# Patient Record
Sex: FEMALE | Race: OTHER | HISPANIC OR LATINO | Employment: UNEMPLOYED | URBAN - METROPOLITAN AREA
[De-identification: names, ages, dates, MRNs, and addresses within clinical notes are randomized per-mention and may not be internally consistent; named-entity substitution may affect disease eponyms.]

---

## 2018-01-15 ENCOUNTER — APPOINTMENT (EMERGENCY)
Dept: RADIOLOGY | Facility: HOSPITAL | Age: 28
End: 2018-01-15
Payer: COMMERCIAL

## 2018-01-15 ENCOUNTER — HOSPITAL ENCOUNTER (EMERGENCY)
Facility: HOSPITAL | Age: 28
Discharge: HOME | End: 2018-01-15
Attending: EMERGENCY MEDICINE | Admitting: EMERGENCY MEDICINE
Payer: COMMERCIAL

## 2018-01-15 VITALS
RESPIRATION RATE: 18 BRPM | WEIGHT: 278 LBS | HEART RATE: 89 BPM | SYSTOLIC BLOOD PRESSURE: 136 MMHG | TEMPERATURE: 98.2 F | DIASTOLIC BLOOD PRESSURE: 75 MMHG | BODY MASS INDEX: 42.13 KG/M2 | HEIGHT: 68 IN | OXYGEN SATURATION: 96 %

## 2018-01-15 DIAGNOSIS — R11.10 VOMITING: ICD-10-CM

## 2018-01-15 DIAGNOSIS — R55 SYNCOPE: Primary | ICD-10-CM

## 2018-01-15 DIAGNOSIS — I48.91 A-FIB (HCC): ICD-10-CM

## 2018-01-15 LAB
ANION GAP SERPL CALCULATED.3IONS-SCNC: 7 MMOL/L (ref 4–13)
APTT PPP: 34 SECONDS (ref 23–35)
ATRIAL RATE: 62 BPM
BASOPHILS # BLD AUTO: 0.05 THOUSANDS/ΜL (ref 0–0.1)
BASOPHILS NFR BLD AUTO: 0 % (ref 0–1)
BUN SERPL-MCNC: 13 MG/DL (ref 5–25)
CALCIUM SERPL-MCNC: 9.1 MG/DL (ref 8.3–10.1)
CHLORIDE SERPL-SCNC: 106 MMOL/L (ref 100–108)
CO2 SERPL-SCNC: 27 MMOL/L (ref 21–32)
CREAT SERPL-MCNC: 0.77 MG/DL (ref 0.6–1.3)
EOSINOPHIL # BLD AUTO: 0.31 THOUSAND/ΜL (ref 0–0.61)
EOSINOPHIL NFR BLD AUTO: 2 % (ref 0–6)
ERYTHROCYTE [DISTWIDTH] IN BLOOD BY AUTOMATED COUNT: 13.7 % (ref 11.6–15.1)
GFR SERPL CREATININE-BSD FRML MDRD: 106 ML/MIN/1.73SQ M
GLUCOSE SERPL-MCNC: 113 MG/DL (ref 65–140)
HCT VFR BLD AUTO: 43 % (ref 34.8–46.1)
HGB BLD-MCNC: 13.3 G/DL (ref 11.5–15.4)
INR PPP: 0.98 (ref 0.86–1.16)
LYMPHOCYTES # BLD AUTO: 1.44 THOUSANDS/ΜL (ref 0.6–4.47)
LYMPHOCYTES NFR BLD AUTO: 10 % (ref 14–44)
MCH RBC QN AUTO: 24.8 PG (ref 26.8–34.3)
MCHC RBC AUTO-ENTMCNC: 30.9 G/DL (ref 31.4–37.4)
MCV RBC AUTO: 80 FL (ref 82–98)
MONOCYTES # BLD AUTO: 0.55 THOUSAND/ΜL (ref 0.17–1.22)
MONOCYTES NFR BLD AUTO: 4 % (ref 4–12)
NEUTROPHILS # BLD AUTO: 11.45 THOUSANDS/ΜL (ref 1.85–7.62)
NEUTS SEG NFR BLD AUTO: 84 % (ref 43–75)
NT-PROBNP SERPL-MCNC: 100 PG/ML
P AXIS: 57 DEGREES
PLATELET # BLD AUTO: 320 THOUSANDS/UL (ref 149–390)
PMV BLD AUTO: 10.4 FL (ref 8.9–12.7)
POTASSIUM SERPL-SCNC: 3.8 MMOL/L (ref 3.5–5.3)
PR INTERVAL: 140 MS
PROTHROMBIN TIME: 13.3 SECONDS (ref 12.1–14.4)
QRS AXIS: 5 DEGREES
QRSD INTERVAL: 118 MS
QT INTERVAL: 416 MS
QTC INTERVAL: 413 MS
RBC # BLD AUTO: 5.37 MILLION/UL (ref 3.81–5.12)
SODIUM SERPL-SCNC: 140 MMOL/L (ref 136–145)
T WAVE AXIS: 34 DEGREES
TROPONIN I SERPL-MCNC: <0.02 NG/ML
VENTRICULAR RATE: 59 BPM
WBC # BLD AUTO: 13.8 THOUSAND/UL (ref 4.31–10.16)

## 2018-01-15 PROCEDURE — 36415 COLL VENOUS BLD VENIPUNCTURE: CPT | Performed by: EMERGENCY MEDICINE

## 2018-01-15 PROCEDURE — 71045 X-RAY EXAM CHEST 1 VIEW: CPT

## 2018-01-15 PROCEDURE — 80048 BASIC METABOLIC PNL TOTAL CA: CPT | Performed by: EMERGENCY MEDICINE

## 2018-01-15 PROCEDURE — 85610 PROTHROMBIN TIME: CPT | Performed by: EMERGENCY MEDICINE

## 2018-01-15 PROCEDURE — 84484 ASSAY OF TROPONIN QUANT: CPT | Performed by: EMERGENCY MEDICINE

## 2018-01-15 PROCEDURE — 99284 EMERGENCY DEPT VISIT MOD MDM: CPT

## 2018-01-15 PROCEDURE — 85025 COMPLETE CBC W/AUTO DIFF WBC: CPT | Performed by: EMERGENCY MEDICINE

## 2018-01-15 PROCEDURE — 83880 ASSAY OF NATRIURETIC PEPTIDE: CPT | Performed by: EMERGENCY MEDICINE

## 2018-01-15 PROCEDURE — 85730 THROMBOPLASTIN TIME PARTIAL: CPT | Performed by: EMERGENCY MEDICINE

## 2018-01-15 PROCEDURE — 96360 HYDRATION IV INFUSION INIT: CPT

## 2018-01-15 PROCEDURE — 93005 ELECTROCARDIOGRAM TRACING: CPT

## 2018-01-15 RX ADMIN — SODIUM CHLORIDE 1000 ML: 0.9 INJECTION, SOLUTION INTRAVENOUS at 16:07

## 2018-01-15 NOTE — ED PROVIDER NOTES
History  Chief Complaint   Patient presents with    Syncope     Pt reports "not feeling well" the last week, states "chest pain the last 3 days" vomiting, "bright red blood" with stool prior to arrival  Pt states on the way here syncopal episode  Pt reports "not feeling right since " Pt reports hx of Afib, reports taking metoprolol and eliquis       History provided by:  Patient  Chest Pain   Pain location:  Substernal area  Pain quality: aching    Pain radiates to:  Does not radiate  Pain severity:  Moderate  Onset quality:  Gradual  Timing:  Constant  Progression:  Worsening  Chronicity:  New  Context: breathing and movement    Relieved by:  Nothing  Worsened by:  Nothing tried  Ineffective treatments:  None tried  Associated symptoms: abdominal pain, nausea and vomiting    Associated symptoms: no cough, no dizziness, no dysphagia, no fever, no headache, no numbness, no shortness of breath and no weakness    Associated symptoms comment:  Bloody stools        None       Past Medical History:   Diagnosis Date    A-fib [facility])        History reviewed  No pertinent surgical history  History reviewed  No pertinent family history  I have reviewed and agree with the history as documented  Social History   Substance Use Topics    Smoking status: Current Every Day Smoker    Smokeless tobacco: Not on file    Alcohol use No        Review of Systems   Constitutional: Negative for chills and fever  HENT: Negative for rhinorrhea, sore throat and trouble swallowing  Eyes: Negative for pain  Respiratory: Negative for cough, shortness of breath, wheezing and stridor  Cardiovascular: Positive for chest pain  Negative for leg swelling  Gastrointestinal: Positive for abdominal pain, nausea and vomiting  Negative for diarrhea  Endocrine: Negative for polyuria  Genitourinary: Negative for dysuria, flank pain and urgency  Musculoskeletal: Negative for joint swelling, myalgias and neck stiffness     Skin: Negative for rash  Allergic/Immunologic: Negative for immunocompromised state  Neurological: Negative for dizziness, syncope, weakness, numbness and headaches  Psychiatric/Behavioral: Negative for confusion and suicidal ideas  All other systems reviewed and are negative  Physical Exam  ED Triage Vitals [01/15/18 1332]   Temperature Pulse Respirations Blood Pressure SpO2   98 4 °F (36 9 °C) 71 20 131/87 98 %      Temp Source Heart Rate Source Patient Position - Orthostatic VS BP Location FiO2 (%)   Oral Monitor Sitting Left arm --      Pain Score       5           Orthostatic Vital Signs  Vitals:    01/15/18 1332 01/15/18 1530   BP: 131/87 136/75   Pulse: 71 89   Patient Position - Orthostatic VS: Sitting        Physical Exam   Constitutional: She is oriented to person, place, and time  She appears well-developed and well-nourished  HENT:   Head: Normocephalic and atraumatic  Eyes: EOM are normal  Pupils are equal, round, and reactive to light  Neck: Normal range of motion  Neck supple  Cardiovascular: Normal rate and regular rhythm  Exam reveals no friction rub  No murmur heard  Pulmonary/Chest: Breath sounds normal  No respiratory distress  She has no wheezes  She has no rales  Abdominal: Soft  Bowel sounds are normal  She exhibits no distension  There is no tenderness  Obese      Musculoskeletal: Normal range of motion  She exhibits no edema or tenderness  Neurological: She is alert and oriented to person, place, and time  Skin: Skin is warm  No rash noted  Psychiatric: She has a normal mood and affect  Nursing note and vitals reviewed        ED Medications  Medications   sodium chloride 0 9 % bolus 1,000 mL (1,000 mL Intravenous New Bag 1/15/18 1607)       Diagnostic Studies  Results Reviewed     Procedure Component Value Units Date/Time    Basic metabolic panel [[id number]] Collected:  01/15/18 1531    Lab Status:  Final result Specimen:  Blood from Arm, Right Updated: 01/15/18 1604     Sodium 140 mmol/L      Potassium 3 8 mmol/L      Chloride 106 mmol/L      CO2 27 mmol/L      Anion Gap 7 mmol/L      BUN 13 mg/dL      Creatinine 0 77 mg/dL      Glucose 113 mg/dL      Calcium 9 1 mg/dL      eGFR 106 ml/min/1 73sq m     Narrative:         National Kidney Disease Education Program recommendations are as follows:  GFR calculation is accurate only with a steady state creatinine  Chronic Kidney disease less than 60 ml/min/1 73 sq  meters  Kidney failure less than 15 ml/min/1 73 sq  meters  B-type natriuretic peptide [[id number]]  (Normal) Collected:  01/15/18 1531    Lab Status:  Final result Specimen:  Blood from Arm, Right Updated:  01/15/18 1604     NT-proBNP 100 pg/mL     Troponin I [[id number]]  (Normal) Collected:  01/15/18 1531    Lab Status:  Final result Specimen:  Blood from Arm, Right Updated:  01/15/18 1601     Troponin I <0 02 ng/mL     Narrative:         [employer] Chemistry analyzer 99% cutoff is > 0 04 ng/mL in network labs    o cTnI 99% cutoff is useful only when applied to patients in the clinical setting of myocardial ischemia  o cTnI 99% cutoff should be interpreted in the context of clinical history, ECG findings and possibly cardiac imaging to establish correct diagnosis  o cTnI 99% cutoff may be suggestive but clearly not indicative of a coronary event without the clinical setting of myocardial ischemia  Nuria Cooper [[id number]]  (Normal) Collected:  01/15/18 1531    Lab Status:  Final result Specimen:  Blood from Arm, Right Updated:  01/15/18 1554     Protime 13 3 seconds      INR 0 98    APTT [[id number]]  (Normal) Collected:  01/15/18 1531    Lab Status:  Final result Specimen:  Blood from Arm, Right Updated:  01/15/18 1554     PTT 34 seconds     Narrative:          Therapeutic Heparin Range = 60-90 seconds    CBC and differential [[id number]]  (Abnormal) Collected:  01/15/18 1531    Lab Status:  Final result Specimen:  Blood from Arm, Right Updated:  01/15/18 1541 WBC 13 80 (H) Thousand/uL      RBC 5 37 (H) Million/uL      Hemoglobin 13 3 g/dL      Hematocrit 43 0 %      MCV 80 (L) fL      MCH 24 8 (L) pg      MCHC 30 9 (L) g/dL      RDW 13 7 %      MPV 10 4 fL      Platelets 958 Thousands/uL      Neutrophils Relative 84 (H) %      Lymphocytes Relative 10 (L) %      Monocytes Relative 4 %      Eosinophils Relative 2 %      Basophils Relative 0 %      Neutrophils Absolute 11 45 (H) Thousands/µL      Lymphocytes Absolute 1 44 Thousands/µL      Monocytes Absolute 0 55 Thousand/µL      Eosinophils Absolute 0 31 Thousand/µL      Basophils Absolute 0 05 Thousands/µL     POCT urinalysis dipstick [[id number]]     Lab Status:  No result Specimen:  Urine     POCT pregnancy, urine [[id number]]     Lab Status:  No result                  XR chest 1 view portable   Final Result by Lum Landau, MD (01/15 3924)      No active pulmonary disease  Workstation performed: [id number]                    Procedures  ECG 12 Lead Documentation  Date/Time: 1/15/2018 1:38 PM  Performed by: Heather Franklin  Authorized by: Heather Franklin     Comments:      Sinus bradycardia with sinus arrhythmia nonspecific intraventricular conduction delay  No ST segment elevations or depressions no ischemic changes  Phone Contacts  ED Phone Contact    ED Course  ED Course                                MDM  Number of Diagnoses or Management Options  A-fib [facility]): new and requires workup  Syncope: new and requires workup  Vomiting: new and requires workup  Diagnosis management comments: This 54-year-old female with history of PCOS  The patient has a history of AFib  On Eliquis metoprolol at this point time  She has intermittent chest discomfort that she is so she ate as well as multiple up multiple episodes of vomiting and some bloody stool as well earlier today  Here in the emergency department blood work has since been unremarkable EKG is unremarkable as well   Here in the emergency department spoke with the patient about stopping smoking  Has appointment with cardiologist in 2 days  I feel like the patient's vomiting as well as intermittent diarrhea and abdominal discomfort is likely secondary to a viral illness  Recommend outpatient management followup given strict instructions when to re      Pt re-examined and evaluated after testing and treatment  Spoke with the patient and feeling improved and sxs have resolved  Will discharge home with close f/u with pcp and instructed to return to the ED if sxs worsen or continue  Pt agrees with the plan for discharge and feels comfortable to go home with proper f/u  Advised to return for worsening or additional problems  Diagnostic tests were reviewed and questions answered  Diagnosis, care plan and treatment options were discussed  The patient understand instructions and will follow up as directed '  turn back to the emergency department  Amount and/or Complexity of Data Reviewed  Clinical lab tests: reviewed and ordered  Tests in the radiology section of CPT®: ordered and reviewed  Review and summarize past medical records: yes      CritCare Time    Disposition  Final diagnoses:   Syncope   Vomiting   A-fib ([address] )     Time reflects when diagnosis was documented in both MDM as applicable and the Disposition within this note     Time User Action Codes Description Comment    1/15/2018  4:58 PM Margarita Rios Add [R55] Syncope     1/15/2018  4:58 PM Farhan LAI Add [R11 10] Vomiting     1/15/2018  4:59 PM Margarita Rios Add [I48 91] A-fib [facility])       ED Disposition     ED Disposition Condition Comment    Discharge  Robertabhinav Bennett discharge to home/self care  Condition at discharge: Stable        Follow-up Information    None       Patient's Medications    No medications on file     No discharge procedures on file      ED Provider  Electronically Signed by           Pieter Grijalva DO  01/15/18 2878

## 2018-01-15 NOTE — DISCHARGE INSTRUCTIONS
A-fib (Atrial Fibrillation)   WHAT YOU NEED TO KNOW:   A-fib may come and go, or it may be a long-term condition  A-fib can cause blood clots, stroke, or heart failure  These conditions may become life-threatening  It is important to treat and manage a-fib to help prevent a blood clot, stroke, or heart failure  DISCHARGE INSTRUCTIONS:   Call 911 for any of the following:   · You have any of the following signs of a heart attack:      ¨ Squeezing, pressure, or pain in your chest that lasts longer than 5 minutes or returns    ¨ Discomfort or pain in your back, neck, jaw, stomach, or arm     ¨ Trouble breathing    ¨ Nausea or vomiting    ¨ Lightheadedness or a sudden cold sweat, especially with chest pain or trouble breathing    · You have any of the following signs of a stroke:      ¨ Numbness or drooping on one side of your face     ¨ Weakness in an arm or leg    ¨ Confusion or difficulty speaking    ¨ Dizziness, a severe headache, or vision loss  Return to the emergency department if:  You have any of the following signs of a blood clot:  · You feel lightheaded, are short of breath, and have chest pain  · You cough up blood  · You have swelling, redness, pain, or warmth in your arm or leg  Contact your cardiologist or healthcare provider if:   · Your target heart rate is not in the range it should be  · You have new or worsening swelling in your legs, feet, ankles, or abdomen  · You are short of breath, even at rest      · You have questions or concerns about your condition or care  Medicines: You may need any of the following:  · Heart medicines  help control your heart rate and rhythm  You may need more than one medicine to treat your symptoms  · Blood thinners    help prevent blood clots  Examples of blood thinners include heparin and warfarin  Clots can cause strokes, heart attacks, and death   The following are general safety guidelines to follow while you are taking a blood thinner:    ¨ Watch for bleeding and bruising while you take blood thinners  Watch for bleeding from your gums or nose  Watch for blood in your urine and bowel movements  Use a soft washcloth on your skin, and a soft toothbrush to brush your teeth  This can keep your skin and gums from bleeding  If you shave, use an electric shaver  Do not play contact sports  ¨ Tell your dentist and other healthcare providers that you take anticoagulants  Wear a bracelet or necklace that says you take this medicine  ¨ Do not start or stop any medicines unless your healthcare provider tells you to  Many medicines cannot be used with blood thinners  ¨ Tell your healthcare provider right away if you forget to take the medicine, or if you take too much  ¨ Warfarin  is a blood thinner that you may need to take  The following are things you should be aware of if you take warfarin  § Foods and medicines can affect the amount of warfarin in your blood  Do not make major changes to your diet while you take warfarin  Warfarin works best when you eat about the same amount of vitamin K every day  Vitamin K is found in green leafy vegetables and certain other foods  Ask for more information about what to eat when you are taking warfarin  § You will need to see your healthcare provider for follow-up visits when you are on warfarin  You will need regular blood tests  These tests are used to decide how much medicine you need  · Antiplatelets , such as aspirin, help prevent blood clots  Take your antiplatelet medicine exactly as directed  These medicines make it more likely for you to bleed or bruise  If you are told to take aspirin, do not take acetaminophen or ibuprofen instead  · Take your medicine as directed  Contact your healthcare provider if you think your medicine is not helping or if you have side effects  Tell him or her if you are allergic to any medicine   Keep a list of the medicines, vitamins, and herbs you take  Include the amounts, and when and why you take them  Bring the list or the pill bottles to follow-up visits  Carry your medicine list with you in case of an emergency  Follow up with your cardiologist as directed: You will need regular blood tests and monitoring  Write down your questions so you remember to ask them during your visits  Manage A-fib:   · Know your target heart rate  Learn how to take your pulse and monitor your heart rate  · Manage other health conditions  This includes high blood pressure, sleep apnea, thyroid disease, diabetes, and other heart conditions  Take medicine as directed and follow your treatment plan  · Limit or do not drink alcohol  Alcohol can make a-fib hard to manage  Ask your healthcare provider if it is safe for you to drink alcohol  A drink of alcohol is 12 ounces of beer, 5 ounces of wine, or 1½ ounces of liquor  · Do not smoke  Nicotine and other chemicals in cigarettes and cigars can cause heart and lung damage  Ask your healthcare provider for information if you currently smoke and need help to quit  E-cigarettes or smokeless tobacco still contain nicotine  Talk to your healthcare provider before you use these products  · Eat heart-healthy foods  Heart healthy foods will help keep your cholesterol low  These include fruits, vegetables, whole-grain breads, low-fat dairy products, beans, lean meats, and fish  Replace butter and margarine with heart-healthy oils such as olive oil and canola oil  · Maintain a healthy weight  Ask your healthcare provider how much you should weigh  Ask him to help you create a weight loss plan if you are overweight  · Exercise for 30 minutes  most days of the week  Ask your healthcare provider about the best exercise plan for you  © 2017 [address] Information is for End User's use only and may not be sold, redistributed or otherwise used for commercial purposes   All illustrations and images included in CareNotes® are the copyrighted property of [employer]  or [employer]  The above information is an  only  It is not intended as medical advice for individual conditions or treatments  Talk to your doctor, nurse or pharmacist before following any medical regimen to see if it is safe and effective for you  A-fib (Atrial Fibrillation)   WHAT YOU NEED TO KNOW:   A-fib may come and go, or it may be a long-term condition  A-fib can cause blood clots, stroke, or heart failure  These conditions may become life-threatening  It is important to treat and manage a-fib to help prevent a blood clot, stroke, or heart failure  DISCHARGE INSTRUCTIONS:   Call 911 for any of the following:   · You have any of the following signs of a heart attack:      ¨ Squeezing, pressure, or pain in your chest that lasts longer than 5 minutes or returns    ¨ Discomfort or pain in your back, neck, jaw, stomach, or arm     ¨ Trouble breathing    ¨ Nausea or vomiting    ¨ Lightheadedness or a sudden cold sweat, especially with chest pain or trouble breathing    · You have any of the following signs of a stroke:      ¨ Numbness or drooping on one side of your face     ¨ Weakness in an arm or leg    ¨ Confusion or difficulty speaking    ¨ Dizziness, a severe headache, or vision loss  Seek care immediately if:  You have any of the following signs of a blood clot:  · You feel lightheaded, are short of breath, and have chest pain  · You cough up blood  · You have swelling, redness, pain, or warmth in your arm or leg  Contact your cardiologist or healthcare provider if:   · Your heart rate is higher than your healthcare provider said it should be  · You have new or worsening swelling in your legs, feet, ankles, or abdomen  · You are short of breath, even at rest      · You have questions or concerns about your condition or care  Medicines:   You may need any of the following:  · Heart medicines  help control your heart rate and rhythm  You may need more than one medicine to treat your symptoms  · Blood thinners    help prevent blood clots  Examples of blood thinners include heparin and warfarin  Clots can cause strokes, heart attacks, and death  The following are general safety guidelines to follow while you are taking a blood thinner:    ¨ Watch for bleeding and bruising while you take blood thinners  Watch for bleeding from your gums or nose  Watch for blood in your urine and bowel movements  Use a soft washcloth on your skin, and a soft toothbrush to brush your teeth  This can keep your skin and gums from bleeding  If you shave, use an electric shaver  Do not play contact sports  ¨ Tell your dentist and other healthcare providers that you take anticoagulants  Wear a bracelet or necklace that says you take this medicine  ¨ Do not start or stop any medicines unless your healthcare provider tells you to  Many medicines cannot be used with blood thinners  ¨ Tell your healthcare provider right away if you forget to take the medicine, or if you take too much  ¨ Warfarin  is a blood thinner that you may need to take  The following are things you should be aware of if you take warfarin  § Foods and medicines can affect the amount of warfarin in your blood  Do not make major changes to your diet while you take warfarin  Warfarin works best when you eat about the same amount of vitamin K every day  Vitamin K is found in green leafy vegetables and certain other foods  Ask for more information about what to eat when you are taking warfarin  § You will need to see your healthcare provider for follow-up visits when you are on warfarin  You will need regular blood tests  These tests are used to decide how much medicine you need  · Antiplatelets , such as aspirin, help prevent blood clots  Take your antiplatelet medicine exactly as directed   These medicines make it more likely for you to bleed or bruise  If you are told to take aspirin, do not take acetaminophen or ibuprofen instead  · Take your medicine as directed  Contact your healthcare provider if you think your medicine is not helping or if you have side effects  Tell him or her if you are allergic to any medicine  Keep a list of the medicines, vitamins, and herbs you take  Include the amounts, and when and why you take them  Bring the list or the pill bottles to follow-up visits  Carry your medicine list with you in case of an emergency  Follow up with your cardiologist as directed: You will need regular blood tests and monitoring  Write down your questions so you remember to ask them during your visits  Manage A-fib:   · Know your target heart rate  Learn how to take your pulse and monitor your heart rate  · Manage other health conditions  This includes high blood pressure, sleep apnea, thyroid disease, diabetes, and other heart conditions  Take medicine as directed and follow your treatment plan  · Limit or do not drink alcohol  Alcohol can make a-fib hard to manage  Ask your healthcare provider if it is safe for you to drink alcohol  A drink of alcohol is 12 ounces of beer, 5 ounces of wine, or 1½ ounces of liquor  · Do not smoke  Nicotine and other chemicals in cigarettes and cigars can cause heart and lung damage  Ask your healthcare provider for information if you currently smoke and need help to quit  E-cigarettes or smokeless tobacco still contain nicotine  Talk to your healthcare provider before you use these products  · Eat heart-healthy foods  Heart healthy foods will help keep your cholesterol low  These include fruits, vegetables, whole-grain breads, low-fat dairy products, beans, lean meats, and fish  Replace butter and margarine with heart-healthy oils such as olive oil and canola oil  · Maintain a healthy weight  Ask your healthcare provider how much you should weigh  Ask him to help you create a weight loss plan if you are overweight  · Exercise for 30 minutes  most days of the week  Ask your healthcare provider about the best exercise plan for you  © 2017 [facility] Information is for End User's use only and may not be sold, redistributed or otherwise used for commercial purposes  All illustrations and images included in CareNotes® are the copyrighted property of [employer]  or [employer]  The above information is an  only  It is not intended as medical advice for individual conditions or treatments  Talk to your doctor, nurse or pharmacist before following any medical regimen to see if it is safe and effective for you

## 2018-01-20 ENCOUNTER — APPOINTMENT (EMERGENCY)
Dept: RADIOLOGY | Facility: HOSPITAL | Age: 28
End: 2018-01-20
Payer: COMMERCIAL

## 2018-01-20 ENCOUNTER — HOSPITAL ENCOUNTER (EMERGENCY)
Facility: HOSPITAL | Age: 28
Discharge: HOME | End: 2018-01-20
Attending: EMERGENCY MEDICINE | Admitting: EMERGENCY MEDICINE
Payer: COMMERCIAL

## 2018-01-20 VITALS
HEART RATE: 71 BPM | DIASTOLIC BLOOD PRESSURE: 84 MMHG | BODY MASS INDEX: 51.09 KG/M2 | TEMPERATURE: 97.8 F | OXYGEN SATURATION: 99 % | WEIGHT: 293 LBS | RESPIRATION RATE: 20 BRPM | SYSTOLIC BLOOD PRESSURE: 149 MMHG

## 2018-01-20 DIAGNOSIS — R07.89 CHEST WALL PAIN: Primary | ICD-10-CM

## 2018-01-20 LAB
ANION GAP SERPL CALCULATED.3IONS-SCNC: 8 MMOL/L (ref 4–13)
BASOPHILS # BLD AUTO: 0.08 THOUSANDS/ΜL (ref 0–0.1)
BASOPHILS NFR BLD AUTO: 1 % (ref 0–1)
BUN SERPL-MCNC: 15 MG/DL (ref 5–25)
CALCIUM SERPL-MCNC: 9 MG/DL (ref 8.3–10.1)
CHLORIDE SERPL-SCNC: 106 MMOL/L (ref 100–108)
CO2 SERPL-SCNC: 26 MMOL/L (ref 21–32)
CREAT SERPL-MCNC: 0.79 MG/DL (ref 0.6–1.3)
EOSINOPHIL # BLD AUTO: 0.71 THOUSAND/ΜL (ref 0–0.61)
EOSINOPHIL NFR BLD AUTO: 7 % (ref 0–6)
ERYTHROCYTE [DISTWIDTH] IN BLOOD BY AUTOMATED COUNT: 13.5 % (ref 11.6–15.1)
GFR SERPL CREATININE-BSD FRML MDRD: 103 ML/MIN/1.73SQ M
GLUCOSE SERPL-MCNC: 120 MG/DL (ref 65–140)
HCG SERPL QL: NEGATIVE
HCT VFR BLD AUTO: 37.6 % (ref 34.8–46.1)
HGB BLD-MCNC: 12 G/DL (ref 11.5–15.4)
LYMPHOCYTES # BLD AUTO: 2.51 THOUSANDS/ΜL (ref 0.6–4.47)
LYMPHOCYTES NFR BLD AUTO: 25 % (ref 14–44)
MCH RBC QN AUTO: 25.3 PG (ref 26.8–34.3)
MCHC RBC AUTO-ENTMCNC: 31.9 G/DL (ref 31.4–37.4)
MCV RBC AUTO: 79 FL (ref 82–98)
MONOCYTES # BLD AUTO: 0.68 THOUSAND/ΜL (ref 0.17–1.22)
MONOCYTES NFR BLD AUTO: 7 % (ref 4–12)
NEUTROPHILS # BLD AUTO: 5.9 THOUSANDS/ΜL (ref 1.85–7.62)
NEUTS SEG NFR BLD AUTO: 60 % (ref 43–75)
PLATELET # BLD AUTO: 319 THOUSANDS/UL (ref 149–390)
PMV BLD AUTO: 10.3 FL (ref 8.9–12.7)
POTASSIUM SERPL-SCNC: 4.2 MMOL/L (ref 3.5–5.3)
RBC # BLD AUTO: 4.74 MILLION/UL (ref 3.81–5.12)
SODIUM SERPL-SCNC: 140 MMOL/L (ref 136–145)
TROPONIN I SERPL-MCNC: <0.02 NG/ML
WBC # BLD AUTO: 9.88 THOUSAND/UL (ref 4.31–10.16)

## 2018-01-20 PROCEDURE — 84484 ASSAY OF TROPONIN QUANT: CPT | Performed by: EMERGENCY MEDICINE

## 2018-01-20 PROCEDURE — 85025 COMPLETE CBC W/AUTO DIFF WBC: CPT | Performed by: EMERGENCY MEDICINE

## 2018-01-20 PROCEDURE — 93005 ELECTROCARDIOGRAM TRACING: CPT | Performed by: EMERGENCY MEDICINE

## 2018-01-20 PROCEDURE — 71046 X-RAY EXAM CHEST 2 VIEWS: CPT

## 2018-01-20 PROCEDURE — 93005 ELECTROCARDIOGRAM TRACING: CPT

## 2018-01-20 PROCEDURE — 80048 BASIC METABOLIC PNL TOTAL CA: CPT | Performed by: EMERGENCY MEDICINE

## 2018-01-20 PROCEDURE — 99285 EMERGENCY DEPT VISIT HI MDM: CPT

## 2018-01-20 PROCEDURE — 36415 COLL VENOUS BLD VENIPUNCTURE: CPT | Performed by: EMERGENCY MEDICINE

## 2018-01-20 PROCEDURE — 84703 CHORIONIC GONADOTROPIN ASSAY: CPT | Performed by: EMERGENCY MEDICINE

## 2018-01-21 LAB
ATRIAL RATE: 73 BPM
P AXIS: 30 DEGREES
PR INTERVAL: 136 MS
QRS AXIS: 2 DEGREES
QRSD INTERVAL: 110 MS
QT INTERVAL: 384 MS
QTC INTERVAL: 415 MS
T WAVE AXIS: 22 DEGREES
VENTRICULAR RATE: 70 BPM

## 2018-01-21 NOTE — DISCHARGE INSTRUCTIONS
Rest  Heat to the area  Follow up with your doctor     Chest Wall Pain   WHAT YOU NEED TO KNOW:   Chest wall pain may be caused by problems with the muscles, cartilage, or bones of the chest wall  Chest wall pain may also be caused by pain that spreads to your chest from another part of your body  The pain may be aching, severe, dull, or sharp  It may come and go, or it may be constant  The pain may be worse when you move in certain ways, breathe deeply, or cough  DISCHARGE INSTRUCTIONS:   Call 911 if:   · You have any of the following signs of a heart attack:      ¨ Squeezing, pressure, or pain in your chest that lasts longer than 5 minutes or returns    ¨ Discomfort or pain in your back, neck, jaw, stomach, or arm     ¨ Trouble breathing    ¨ Nausea or vomiting    ¨ Lightheadedness or a sudden cold sweat, especially with chest pain or trouble breathing    Return to the emergency department if:   · You have severe pain  Contact your healthcare provider if:   · You develop a rash  · You have other new symptoms  · Your pain does not improve, even with treatment  · You have questions or concerns about your condition or care  Medicines: You may need any of the following:  · NSAIDs , such as ibuprofen, help decrease swelling, pain, and fever  This medicine is available with or without a doctor's order  NSAIDs can cause stomach bleeding or kidney problems in certain people  If you take blood thinner medicine, always ask your healthcare provider if NSAIDs are safe for you  Always read the medicine label and follow directions  · Acetaminophen  decreases pain  It is available without a doctor's order  Ask how much to take and how often to take it  Follow directions  Acetaminophen can cause liver damage if not taken correctly  · A cream  may be applied to your chest to decrease pain  · Take your medicine as directed    Contact your healthcare provider if you think your medicine is not helping or if you have side effects  Tell him of her if you are allergic to any medicine  Keep a list of the medicines, vitamins, and herbs you take  Include the amounts, and when and why you take them  Bring the list or the pill bottles to follow-up visits  Carry your medicine list with you in case of an emergency  Follow up with your healthcare provider as directed:  Write down your questions so you remember to ask them during your visits  Self-care:   · Rest  as needed  Avoid activities that make your chest wall pain worse  · Apply heat  on your chest for 20 to 30 minutes every 2 hours for as many days as directed  Heat helps decrease pain and muscle spasms  · Apply ice  on your chest for 15 to 20 minutes every hour or as directed  Use an ice pack, or put crushed ice in a plastic bag  Cover it with a towel  Ice helps prevent tissue damage and decreases swelling and pain  © 2017 [address] [employer]  Information is for End User's use only and may not be sold, redistributed or otherwise used for commercial purposes  All illustrations and images included in CareNotes® are the copyrighted property of [employer]  or [employer]  The above information is an  only  It is not intended as medical advice for individual conditions or treatments  Talk to your doctor, nurse or pharmacist before following any medical regimen to see if it is safe and effective for you

## 2018-01-21 NOTE — ED PROVIDER NOTES
History  Chief Complaint   Patient presents with    Chest Pain     Pt  complains of chest heaviness, states has been going on for a few weeks, getting worse  Seen recently for same  (+hx  A-Fib)  Pt  states worse when resting, does not feel pain when moving around     HPI patient is a 44-year-old female, history of atrial fibrillation that is intermittent, apparently takes Eliquis to, reports that over the last few days she has had intermittent chest heaviness, describes it as a pressure sensation worse when she palpates her chest   Worse when she touches the area  Worse when she moves  Pain is without radiation  Pain seems to the somewhat come ago although patient was here on Monday with similar pain and reports constant since then  Patient reports she felt that specifically at work tonight so came to the emergency department  She denies any shortness of breath there is no diaphoresis  There is no pain with exertion  She reports the pain is just a constant soreness in her anterior chest again worse when she sits up leans back moves her arms or palpates her chest   Past medical history of atrial fibrillation on anticoagulants, no history of pulmonary emboli  Family history noncontributory  Social history patient is a smoker, denies drug abuse    Prior to Admission Medications   Prescriptions Last Dose Informant Patient Reported? Taking? apixaban (ELIQUIS) 5 mg   Yes Yes   Sig: Take 5 mg by mouth 2 (two) times a day   metoprolol tartrate (LOPRESSOR) 25 mg tablet   Yes Yes   Sig: Take 12 5 mg by mouth every 12 (twelve) hours      Facility-Administered Medications: None       Past Medical History:   Diagnosis Date    [facility]     PCOS (polycystic ovarian syndrome)        History reviewed  No pertinent surgical history  History reviewed  No pertinent family history  I have reviewed and agree with the history as documented      Social History   Substance Use Topics    Smoking status: Current Every Day Smoker     Packs/day: 0 20    Smokeless tobacco: Never Used    Alcohol use No        Review of Systems   Constitutional: Negative for diaphoresis, fatigue and fever  HENT: Negative for congestion, ear pain, nosebleeds and sore throat  Eyes: Negative for photophobia, pain, discharge and visual disturbance  Respiratory: Negative for cough, choking, chest tightness, shortness of breath and wheezing  Cardiovascular: Positive for chest pain  Negative for palpitations  Gastrointestinal: Negative for abdominal distention, abdominal pain, diarrhea and vomiting  Genitourinary: Negative for dysuria, flank pain and frequency  Musculoskeletal: Negative for back pain, gait problem and joint swelling  Skin: Negative for color change and rash  Neurological: Negative for dizziness, syncope and headaches  Psychiatric/Behavioral: Negative for behavioral problems and confusion  The patient is not nervous/anxious  All other systems reviewed and are negative  Physical Exam  ED Triage Vitals [01/20/18 2043]   Temperature Pulse Respirations Blood Pressure SpO2   97 8 °F (36 6 °C) 74 18 131/68 98 %      Temp Source Heart Rate Source Patient Position - Orthostatic VS BP Location FiO2 (%)   Oral Monitor Sitting Left arm --      Pain Score       8           Orthostatic Vital Signs  Vitals:    01/20/18 2043 01/20/18 2158   BP: 131/68 149/84   Pulse: 74 71   Patient Position - Orthostatic VS: Sitting Sitting       Physical Exam   Constitutional: She is oriented to person, place, and time  She appears well-developed and well-nourished  HENT:   Head: Normocephalic  Right Ear: External ear normal    Left Ear: External ear normal    Nose: Nose normal    Mouth/Throat: Oropharynx is clear and moist    Eyes: EOM and lids are normal  Pupils are equal, round, and reactive to light  Neck: Normal range of motion  Neck supple     Cardiovascular: Normal rate, regular rhythm, normal heart sounds and intact distal pulses  Pulmonary/Chest: Effort normal and breath sounds normal  No respiratory distress  She exhibits tenderness  There is anterior chest tenderness there is no redness there is no warmth there is no lesions in her breast, there is no sign of cellulitis there is pain with palpation that reproduces her pain   Abdominal: Soft  She exhibits no distension and no mass  There is no tenderness  There is no rebound and no guarding  No hernia  Musculoskeletal: Normal range of motion  She exhibits no deformity  Neurological: She is alert and oriented to person, place, and time  Skin: Skin is warm and dry  Psychiatric: She has a normal mood and affect  Nursing note and vitals reviewed  Pulse oximetry 99 percent on room air adequate oxygenation, there is no hypoxia  ED Medications  Medications - No data to display    Diagnostic Studies  Results Reviewed     Procedure Component Value Units Date/Time    Basic metabolic panel [[id number]]  (Normal) Collected:  01/20/18 2218    Lab Status:  Final result Specimen:  Blood from Arm, Left Updated:  01/20/18 2300     Sodium 140 mmol/L      Potassium 4 2 mmol/L      Chloride 106 mmol/L      CO2 26 mmol/L      Anion Gap 8 mmol/L      BUN 15 mg/dL      Creatinine 0 79 mg/dL      Glucose 120 mg/dL      Calcium 9 0 mg/dL      eGFR 103 ml/min/1 73sq m     Narrative:         National Kidney Disease Education Program recommendations are as follows:  GFR calculation is accurate only with a steady state creatinine  Chronic Kidney disease less than 60 ml/min/1 73 sq  meters  Kidney failure less than 15 ml/min/1 73 sq  meters      hCG, qualitative pregnancy [[id number]]  (Normal) Collected:  01/20/18 2218    Lab Status:  Final result Specimen:  Blood from Arm, Left Updated:  01/20/18 2300     Preg, Serum Negative    Troponin I [[id number]]  (Normal) Collected:  01/20/18 2218    Lab Status:  Final result Specimen:  Blood from Arm, Left Updated:  01/20/18 2246     Troponin I <0 02 ng/mL Narrative:         [employer] Chemistry analyzer 99% cutoff is > 0 04 ng/mL in network labs    o cTnI 99% cutoff is useful only when applied to patients in the clinical setting of myocardial ischemia  o cTnI 99% cutoff should be interpreted in the context of clinical history, ECG findings and possibly cardiac imaging to establish correct diagnosis  o cTnI 99% cutoff may be suggestive but clearly not indicative of a coronary event without the clinical setting of myocardial ischemia      CBC and differential [[id number]]  (Abnormal) Collected:  01/20/18 2218    Lab Status:  Final result Specimen:  Blood from Arm, Left Updated:  01/20/18 2227     WBC 9 88 Thousand/uL      RBC 4 74 Million/uL      Hemoglobin 12 0 g/dL      Hematocrit 37 6 %      MCV 79 (L) fL      MCH 25 3 (L) pg      MCHC 31 9 g/dL      RDW 13 5 %      MPV 10 3 fL      Platelets 802 Thousands/uL      Neutrophils Relative 60 %      Lymphocytes Relative 25 %      Monocytes Relative 7 %      Eosinophils Relative 7 (H) %      Basophils Relative 1 %      Neutrophils Absolute 5 90 Thousands/µL      Lymphocytes Absolute 2 51 Thousands/µL      Monocytes Absolute 0 68 Thousand/µL      Eosinophils Absolute 0 71 (H) Thousand/µL      Basophils Absolute 0 08 Thousands/µL                  XR chest pa & lateral    (Results Pending)              Procedures  ECG 12 Lead Documentation  Date/Time: 1/20/2018 10:27 PM  Performed by: Alfredito Wagner  Authorized by: Alfredito Wagner     Indications / Diagnosis:  Chest pain  ECG reviewed by me, the ED Provider: yes    Patient location:  ED  Previous ECG:     Previous ECG:  Compared to current    Comparison ECG info:  January 15, 2018    Similarity:  No change  Interpretation:     Interpretation: non-specific    Rate:     ECG rate:  Seventy    ECG rate assessment: normal    Rhythm:     Rhythm: sinus rhythm    Ectopy:     Ectopy: none    QRS:     QRS axis:  Normal  ST segments:     ST segments:  Non-specific  T waves:     T waves: non-specific    Comments: There is some baseline artifact, nonspecific ST-T wave changes, incomplete right bundle branch block, otherwise no real change from January 15, 2018           Phone Contacts  ED Phone Contact    ED Course  ED Course         Chest x-ray: Chest x-ray showed a normal cardiac silhouette, no pneumothorax no infiltrates, No sign of pathology, interpreted by me, I was the primary   HEART Risk Score    Flowsheet Row Most Recent Value   History  0 Filed at: 01/21/2018 0432   ECG  0 Filed at: 01/21/2018 0432   Age  0 Filed at: 01/21/2018 0432   Risk Factors  1 Filed at: 01/21/2018 0432   Troponin  0 Filed at: 01/21/2018 0432   Heart Score Risk Calculator   History  0 Filed at: 01/21/2018 0432   ECG  0 Filed at: 01/21/2018 0432   Age  0 Filed at: 01/21/2018 0432   Risk Factors  1 Filed at: 01/21/2018 0432   Troponin  0 Filed at: 01/21/2018 0432   HEART Score  1 Filed at: 01/21/2018 0432   HEART Score  1 Filed at: 01/21/2018 0432         diagnostic testing showed normal white count 9 8, no sign of inflammation, hemoglobin normal at 12 no sign of anemia  pregnancy test was negative  Cardiac troponin was negative no sign of cardiac ischemia despite several days of discomfort  MDM medical decision making 26-year-old female, history of atrial fibrillation in the past presents with anterior chest pain worse with palpation and with movement most consistent with chest wall pain, normal EKG and cardiac troponin despite several days of symptoms  Discussed outpatient follow-up with the patient discussed indications to return    CritCare Time    Disposition  Final diagnoses:   Chest wall pain     Time reflects when diagnosis was documented in both MDM as applicable and the Disposition within this note     Time User Action Codes Description Comment    1/20/2018 11:11 PM Allison Cortes Add [R07 [id number]] Chest wall pain       ED Disposition     ED Disposition Condition Comment Discharge  Robertia Dariusz discharge to home/self care  Condition at discharge: Good        Follow-up Information     Follow up With Specialties Details [address], DO    Chanel Eva  [address],[facility]  [phone]          Discharge Medication List as of 1/20/2018 11:11 PM      CONTINUE these medications which have NOT CHANGED    Details   apixaban (ELIQUIS) 5 mg Take 5 mg by mouth 2 (two) times a day, Historical Med      metoprolol tartrate (LOPRESSOR) 25 mg tablet Take 12 5 mg by mouth every 12 (twelve) hours, Historical Med           No discharge procedures on file      ED Provider  Electronically Signed by           Denzel Dubin, MD  01/21/18 0452

## 2018-02-18 ENCOUNTER — APPOINTMENT (EMERGENCY)
Dept: RADIOLOGY | Facility: HOSPITAL | Age: 28
End: 2018-02-18
Payer: COMMERCIAL

## 2018-02-18 ENCOUNTER — HOSPITAL ENCOUNTER (EMERGENCY)
Facility: HOSPITAL | Age: 28
Discharge: HOME | End: 2018-02-18
Attending: EMERGENCY MEDICINE
Payer: COMMERCIAL

## 2018-02-18 VITALS
DIASTOLIC BLOOD PRESSURE: 63 MMHG | WEIGHT: 280 LBS | TEMPERATURE: 97.7 F | RESPIRATION RATE: 22 BRPM | HEART RATE: 62 BPM | BODY MASS INDEX: 43.95 KG/M2 | OXYGEN SATURATION: 98 % | HEIGHT: 67 IN | SYSTOLIC BLOOD PRESSURE: 145 MMHG

## 2018-02-18 DIAGNOSIS — R07.89 NON-CARDIAC CHEST PAIN: Primary | ICD-10-CM

## 2018-02-18 DIAGNOSIS — F41.9 ANXIETY: ICD-10-CM

## 2018-02-18 LAB
ALBUMIN SERPL BCP-MCNC: 3.3 G/DL (ref 3.5–5)
ALP SERPL-CCNC: 131 U/L (ref 46–116)
ALT SERPL W P-5'-P-CCNC: 33 U/L (ref 12–78)
ANION GAP SERPL CALCULATED.3IONS-SCNC: 5 MMOL/L (ref 4–13)
AST SERPL W P-5'-P-CCNC: 29 U/L (ref 5–45)
ATRIAL RATE: 58 BPM
BASOPHILS # BLD AUTO: 0.2 THOUSANDS/ΜL (ref 0–0.1)
BASOPHILS NFR BLD AUTO: 2 % (ref 0–1)
BILIRUB SERPL-MCNC: 0.3 MG/DL (ref 0.2–1)
BUN SERPL-MCNC: 13 MG/DL (ref 5–25)
CALCIUM SERPL-MCNC: 8.5 MG/DL (ref 8.3–10.1)
CHLORIDE SERPL-SCNC: 109 MMOL/L (ref 100–108)
CO2 SERPL-SCNC: 30 MMOL/L (ref 21–32)
CREAT SERPL-MCNC: 0.59 MG/DL (ref 0.6–1.3)
DEPRECATED D DIMER PPP: 330 NG/ML (FEU) (ref 190–520)
EOSINOPHIL # BLD AUTO: 0.5 THOUSAND/ΜL (ref 0–0.61)
EOSINOPHIL NFR BLD AUTO: 5 % (ref 0–6)
ERYTHROCYTE [DISTWIDTH] IN BLOOD BY AUTOMATED COUNT: 13.7 % (ref 11.6–15.1)
GFR SERPL CREATININE-BSD FRML MDRD: 126 ML/MIN/1.73SQ M
GLUCOSE SERPL-MCNC: 110 MG/DL (ref 65–140)
HCT VFR BLD AUTO: 36.1 % (ref 37–47)
HGB BLD-MCNC: 11.9 G/DL (ref 12–16)
LYMPHOCYTES # BLD AUTO: 2.8 THOUSANDS/ΜL (ref 0.6–4.47)
LYMPHOCYTES NFR BLD AUTO: 30 % (ref 14–44)
MCH RBC QN AUTO: 25.8 PG (ref 27–31)
MCHC RBC AUTO-ENTMCNC: 32.8 G/DL (ref 31.4–37.4)
MCV RBC AUTO: 79 FL (ref 82–98)
MONOCYTES # BLD AUTO: 0.6 THOUSAND/ΜL (ref 0.17–1.22)
MONOCYTES NFR BLD AUTO: 6 % (ref 4–12)
NEUTROPHILS # BLD AUTO: 5.2 THOUSANDS/ΜL (ref 1.85–7.62)
NEUTS SEG NFR BLD AUTO: 56 % (ref 43–75)
NRBC BLD AUTO-RTO: 0 /100 WBCS
P AXIS: 40 DEGREES
PLATELET # BLD AUTO: 313 THOUSANDS/UL (ref 130–400)
PMV BLD AUTO: 8.6 FL (ref 8.9–12.7)
POTASSIUM SERPL-SCNC: 4.4 MMOL/L (ref 3.5–5.3)
PR INTERVAL: 136 MS
PROT SERPL-MCNC: 7 G/DL (ref 6.4–8.2)
QRS AXIS: 3 DEGREES
QRSD INTERVAL: 120 MS
QT INTERVAL: 434 MS
QTC INTERVAL: 426 MS
RBC # BLD AUTO: 4.6 MILLION/UL (ref 4.2–5.4)
SODIUM SERPL-SCNC: 144 MMOL/L (ref 136–145)
T WAVE AXIS: 38 DEGREES
TROPONIN I SERPL-MCNC: <0.02 NG/ML
VENTRICULAR RATE: 58 BPM
WBC # BLD AUTO: 9.3 THOUSAND/UL (ref 4.8–10.8)

## 2018-02-18 PROCEDURE — 93010 ELECTROCARDIOGRAM REPORT: CPT | Performed by: INTERNAL MEDICINE

## 2018-02-18 PROCEDURE — 93005 ELECTROCARDIOGRAM TRACING: CPT

## 2018-02-18 PROCEDURE — 80053 COMPREHEN METABOLIC PANEL: CPT | Performed by: EMERGENCY MEDICINE

## 2018-02-18 PROCEDURE — 85025 COMPLETE CBC W/AUTO DIFF WBC: CPT | Performed by: EMERGENCY MEDICINE

## 2018-02-18 PROCEDURE — 99285 EMERGENCY DEPT VISIT HI MDM: CPT

## 2018-02-18 PROCEDURE — 84484 ASSAY OF TROPONIN QUANT: CPT | Performed by: EMERGENCY MEDICINE

## 2018-02-18 PROCEDURE — 85379 FIBRIN DEGRADATION QUANT: CPT | Performed by: EMERGENCY MEDICINE

## 2018-02-18 PROCEDURE — 36415 COLL VENOUS BLD VENIPUNCTURE: CPT | Performed by: EMERGENCY MEDICINE

## 2018-02-18 PROCEDURE — 71046 X-RAY EXAM CHEST 2 VIEWS: CPT

## 2018-02-19 NOTE — DISCHARGE INSTRUCTIONS
Anxiety   WHAT YOU SHOULD KNOW:   Anxiety is a condition that causes you to feel excessive worry, uneasiness, or fear  Family or work stress, smoking, caffeine, and alcohol can increase your risk for anxiety  Certain medicines or health conditions can also increase your risk  Anxiety may begin gradually, and can become a long-term condition if it is not managed or treated  AFTER YOU LEAVE:   Medicines:   · Medicines  can help you feel more calm and relaxed, and decrease your symptoms  · Take your medicine as directed  Contact your healthcare provider if you think your medicine is not helping or if you have side effects  Tell him if you are allergic to any medicine  Keep a list of the medicines, vitamins, and herbs you take  Include the amounts, and when and why you take them  Bring the list or the pill bottles to follow-up visits  Carry your medicine list with you in case of an emergency  Follow up with your healthcare provider within 2 weeks or as directed:  Write down your questions so you remember to ask them during your visits  Manage anxiety:   · Go to counseling as directed  Cognitive behavioral therapy can help you understand and change how you react to events that trigger your symptoms  · Find ways to manage your symptoms  Activities such as exercise, meditation, or listening to music can help you relax  · Practice deep breathing  Breathing can change how your body reacts to stress  Focus on taking slow, deep breaths several times a day, or during an anxiety attack  Breathe in through your nose, and out through your mouth  · Avoid caffeine  Caffeine can make your symptoms worse  Avoid foods or drinks that are meant to increase your energy level  · Limit or avoid alcohol  Ask your healthcare provider if alcohol is safe for you  You may not be able to drink alcohol if you take certain anxiety or depression medicines  Limit alcohol to 1 drink per day if you are a woman   Limit alcohol to 2 drinks per day if you are a man  A drink of alcohol is 12 ounces of beer, 5 ounces of wine, or 1½ ounces of liquor  Contact your healthcare provider if:   · Your symptoms get worse or do not get better with treatment  · You think your medicine may be causing side effects  · Your anxiety keeps you from doing your regular daily activities  · You have new symptoms since your last visit  · You have questions or concerns about your condition or care  Seek care immediately or call 911 if:   · You have chest pain, tightness, or heaviness that may spread to your shoulders, arms, jaw, neck, or back  · You feel like hurting yourself or someone else  · You feel dizzy, lightheaded, or faint  © 2014 [address] is for End User's use only and may not be sold, redistributed or otherwise used for commercial purposes  All illustrations and images included in CareNotes® are the copyrighted property of [employer]  or [employer]  The above information is an  only  It is not intended as medical advice for individual conditions or treatments  Talk to your doctor, nurse or pharmacist before following any medical regimen to see if it is safe and effective for you  Noncardiac Chest Pain   AMBULATORY CARE:   Noncardiac chest pain  is pain or discomfort in your chest that is not caused by a heart problem  It may be caused by any of the following:  · Acid reflux    · Nerve or muscle problems within the esophagus that slow the movement of food    · Increased sensitivity to pain within your esophagus     · Panic attacks, anxiety, or depression     · Chest wall, muscle, or rib pain  Seek care immediately if:   · You have severe chest pain  Contact your healthcare provider if:   · Your chest pain does not get better, even with treatment  · You have questions or concerns about your condition or care    Treatment  Treatment depends on the cause of your chest pain  You may need any of the following:  · Medicines  may be given to treat the cause of your chest pain  You may be given medicines to decrease pain, relieve anxiety, decrease acid reflux, or relax muscles in your esophagus  · Cognitive therapy  may be helpful if you have panic attacks, anxiety, or depression  It can help you change how you react to situations that tend to trigger your chest pain  Follow up with your healthcare provider as directed:  Write down your questions so you remember to ask them during your visits  © 2017 [address] [facility] Information is for End User's use only and may not be sold, redistributed or otherwise used for commercial purposes  All illustrations and images included in CareNotes® are the copyrighted property of [employer]  or [employer]  The above information is an  only  It is not intended as medical advice for individual conditions or treatments  Talk to your doctor, nurse or pharmacist before following any medical regimen to see if it is safe and effective for you

## 2018-02-19 NOTE — ED PROVIDER NOTES
History  Chief Complaint   Patient presents with    Chest Pain     Patient statesm she started with chest pain today,has continued,has a history of afib     22-year-old morbidly obese white female presents with complaints of intermittent chest pain x1 day  Patient describes it as chest pressure and heaviness that makes it difficult to breathe  No nausea, no vomiting, no fever, no cough, no trauma  Patient has had similar symptoms in the past   Patient has had multiple workups without any definitive explanation  Patient sees a cardiologist   History of AFib  Patient compliant with medication  No leg swelling, no calf tenderness  Intermittent knee pain  Patient states she does have a history of anxiety  Patient refusing any medication at this time  History provided by:  Patient  Chest Pain   Pain location:  Substernal area  Pain quality: pressure    Pain quality: not radiating    Pain radiates to:  Does not radiate  Pain radiates to the back: no    Pain severity:  Moderate  Onset quality:  Gradual  Duration:  1 day  Timing:  Intermittent  Progression:  Waxing and waning  Chronicity:  Recurrent  Context: at rest    Context: not breathing and no movement    Relieved by:  None tried  Worsened by:  Nothing tried  Ineffective treatments:  Leaning forward and rest  Associated symptoms: anxiety    Associated symptoms: no abdominal pain, no claudication, no cough, no diaphoresis, no dizziness, no fever, no nausea, no palpitations, no shortness of breath, not vomiting and no weakness    Risk factors: obesity and smoking        Prior to Admission Medications   Prescriptions Last Dose Informant Patient Reported? Taking?    apixaban (ELIQUIS) 5 mg   Yes Yes   Sig: Take 5 mg by mouth 2 (two) times a day   metoprolol tartrate (LOPRESSOR) 25 mg tablet   Yes Yes   Sig: Take 12 5 mg by mouth every 12 (twelve) hours      Facility-Administered Medications: None       Past Medical History:   Diagnosis Date    A-fib ([facility] )  PCOS (polycystic ovarian syndrome)        Past Surgical History:   Procedure Laterality Date    OOPHORECTOMY         History reviewed  No pertinent family history  I have reviewed and agree with the history as documented  Social History   Substance Use Topics    Smoking status: Current Every Day Smoker     Packs/day: 0 20    Smokeless tobacco: Never Used    Alcohol use No        Review of Systems   Constitutional: Negative for diaphoresis and fever  HENT: Negative  Respiratory: Negative for cough, shortness of breath, wheezing and stridor  Cardiovascular: Positive for chest pain  Negative for palpitations, claudication and leg swelling  Gastrointestinal: Negative for abdominal pain, nausea and vomiting  Genitourinary: Negative  Musculoskeletal: Negative  Skin: Negative  Neurological: Negative for dizziness and weakness  Hematological: Negative  Psychiatric/Behavioral: Negative  All other systems reviewed and are negative  Physical Exam  ED Triage Vitals [02/18/18 2107]   Temp Pulse Respirations Blood Pressure SpO2   -- 57 20 131/60 98 %      Temp src Heart Rate Source Patient Position - Orthostatic VS BP Location FiO2 (%)   -- Monitor Lying Left arm --      Pain Score       --           Orthostatic Vital Signs  Vitals:    02/18/18 2107 02/18/18 2215   BP: 131/60    Pulse: 57 62   Patient Position - Orthostatic VS: Lying        Physical Exam   Constitutional: She is oriented to person, place, and time  She appears well-developed and well-nourished  No distress  HENT:   Head: Normocephalic and atraumatic  Right Ear: External ear normal    Left Ear: External ear normal    Nose: Nose normal    Mouth/Throat: Oropharynx is clear and moist    Eyes: Conjunctivae and EOM are normal    Neck: Normal range of motion  Neck supple  Cardiovascular: Normal rate, regular rhythm, normal heart sounds and intact distal pulses      Pulmonary/Chest: Effort normal and breath sounds normal  No respiratory distress  She has no wheezes  She has no rales  Abdominal: Soft  Bowel sounds are normal    Musculoskeletal: Normal range of motion  Neurological: She is alert and oriented to person, place, and time  Skin: Skin is warm  Capillary refill takes less than 2 seconds  Psychiatric: She has a normal mood and affect  Her behavior is normal  Judgment and thought content normal    Nursing note and vitals reviewed  ED Medications  Medications - No data to display    Diagnostic Studies  Results Reviewed     Procedure Component Value Units Date/Time    D-Dimer [[id number]]  (Normal) Collected:  02/18/18 2128    Lab Status:  Final result Specimen:  Blood from Arm, Right Updated:  02/18/18 2206     D-Dimer, Quant 330 ng/ml (FEU)     Troponin I [[id number]]  (Normal) Collected:  02/18/18 2129    Lab Status:  Final result Specimen:  Blood from Arm, Right Updated:  02/18/18 2158     Troponin I <0 02 ng/mL     Narrative:         [employer] Chemistry analyzer 99% cutoff is > 0 04 ng/mL in network labs    o cTnI 99% cutoff is useful only when applied to patients in the clinical setting of myocardial ischemia  o cTnI 99% cutoff should be interpreted in the context of clinical history, ECG findings and possibly cardiac imaging to establish correct diagnosis  o cTnI 99% cutoff may be suggestive but clearly not indicative of a coronary event without the clinical setting of myocardial ischemia      Comprehensive metabolic panel [[id number]]  (Abnormal) Collected:  02/18/18 2129    Lab Status:  Final result Specimen:  Blood from Arm, Right Updated:  02/18/18 2157     Sodium 144 mmol/L      Potassium 4 4 mmol/L      Chloride 109 (H) mmol/L      CO2 30 mmol/L      Anion Gap 5 mmol/L      BUN 13 mg/dL      Creatinine 0 59 (L) mg/dL      Glucose 110 mg/dL      Calcium 8 5 mg/dL      AST 29 U/L      ALT 33 U/L      Alkaline Phosphatase 131 (H) U/L      Total Protein 7 0 g/dL      Albumin 3 3 (L) g/dL      Total Bilirubin 0 30 mg/dL      eGFR 126 ml/min/1 73sq m     Narrative:         National Kidney Disease Education Program recommendations are as follows:  GFR calculation is accurate only with a steady state creatinine  Chronic Kidney disease less than 60 ml/min/1 73 sq  meters  Kidney failure less than 15 ml/min/1 73 sq  meters      CBC and differential [[id number]]  (Abnormal) Collected:  02/18/18 2128    Lab Status:  Final result Specimen:  Blood from Arm, Right Updated:  02/18/18 2143     WBC 9 30 Thousand/uL      RBC 4 60 Million/uL      Hemoglobin 11 9 (L) g/dL      Hematocrit 36 1 (L) %      MCV 79 (L) fL      MCH 25 8 (L) pg      MCHC 32 8 g/dL      RDW 13 7 %      MPV 8 6 (L) fL      Platelets 816 Thousands/uL      nRBC 0 /100 WBCs      Neutrophils Relative 56 %      Lymphocytes Relative 30 %      Monocytes Relative 6 %      Eosinophils Relative 5 %      Basophils Relative 2 (H) %      Neutrophils Absolute 5 20 Thousands/µL      Lymphocytes Absolute 2 80 Thousands/µL      Monocytes Absolute 0 60 Thousand/µL      Eosinophils Absolute 0 50 Thousand/µL      Basophils Absolute 0 20 (H) Thousands/µL     APTT [[id number]] Collected:  02/18/18 2129    Lab Status:  No result Specimen:  Blood from Arm, Right     Protime-INR [[id number]] Collected:  02/18/18 2129    Lab Status:  No result Specimen:  Blood from Arm, Right                  X-ray chest 2 views   ED Interpretation by Bharat Mukherjee MD (02/18 2210)   NAD                 Procedures  ECG 12 Lead Documentation  Date/Time: 2/18/2018 9:06 PM  Performed by: Auburn Alpers  Authorized by: Bassam HOOD     Indications / Diagnosis:  CP  ECG reviewed by me, the ED Provider: yes    Patient location:  ED  Previous ECG:     Comparison to cardiac monitor: Yes (SB 58)    Interpretation:     Interpretation: abnormal    Rate:     ECG rate:  58    ECG rate assessment: bradycardic    Rhythm:     Rhythm: sinus bradycardia    Ectopy:     Ectopy: none    QRS:     QRS axis:  Normal    QRS intervals: Normal  Conduction:     Conduction: abnormal      Abnormal conduction: non-specific intraventricular conduction delay    ST segments:     ST segments:  Normal  T waves:     T waves: normal             Phone Contacts  ED Phone Contact    ED Course  ED Course                                MDM  CritCare Time    Disposition  Final diagnoses:   Non-cardiac chest pain   Anxiety     Time reflects when diagnosis was documented in both MDM as applicable and the Disposition within this note     Time User Action Codes Description Comment    2/18/2018 10:20 PM Evi Host Add [R07 89] Non-cardiac chest pain     2/18/2018 10:21 PM Torres Richard Add [F41 9] Anxiety       ED Disposition     ED Disposition Condition Comment    Discharge  Dionicio Nicole discharge to home/self care  Condition at discharge: Stable        Follow-up Information     Follow up With Specialties Details Why Contact Info    Nura Lozano DO  Schedule an appointment as soon as possible for a visit in 1 day  [facility]  [address]  [phone]          Patient's Medications   Discharge Prescriptions    No medications on file     No discharge procedures on file      ED Provider  Electronically Signed by           Bi Tavares MD  02/18/18 9005

## 2018-04-03 ENCOUNTER — HOSPITAL ENCOUNTER (EMERGENCY)
Facility: HOSPITAL | Age: 28
Discharge: ANOTHER HOSPITAL | End: 2018-04-03
Attending: EMERGENCY MEDICINE
Payer: COMMERCIAL

## 2018-04-03 ENCOUNTER — APPOINTMENT (EMERGENCY)
Dept: ULTRASOUND IMAGING | Facility: HOSPITAL | Age: 28
End: 2018-04-03
Payer: COMMERCIAL

## 2018-04-03 VITALS
DIASTOLIC BLOOD PRESSURE: 62 MMHG | OXYGEN SATURATION: 99 % | TEMPERATURE: 98.4 F | BODY MASS INDEX: 43.8 KG/M2 | SYSTOLIC BLOOD PRESSURE: 150 MMHG | HEART RATE: 70 BPM | RESPIRATION RATE: 16 BRPM | WEIGHT: 289 LBS | HEIGHT: 68 IN

## 2018-04-03 DIAGNOSIS — Z34.90 PREGNANT: Primary | ICD-10-CM

## 2018-04-03 DIAGNOSIS — R10.9 ABDOMINAL PAIN: ICD-10-CM

## 2018-04-03 DIAGNOSIS — O00.90 ECTOPIC PREGNANCY: ICD-10-CM

## 2018-04-03 LAB
ABO GROUP BLD: NORMAL
ALBUMIN SERPL BCP-MCNC: 3.3 G/DL (ref 3.5–5)
ALP SERPL-CCNC: 119 U/L (ref 46–116)
ALT SERPL W P-5'-P-CCNC: 32 U/L (ref 12–78)
ANION GAP SERPL CALCULATED.3IONS-SCNC: 5 MMOL/L (ref 4–13)
AST SERPL W P-5'-P-CCNC: 19 U/L (ref 5–45)
B-HCG SERPL-ACNC: 433 MIU/ML
BACTERIA UR QL AUTO: ABNORMAL /HPF
BASOPHILS # BLD AUTO: 0.05 THOUSANDS/ΜL (ref 0–0.1)
BASOPHILS NFR BLD AUTO: 1 % (ref 0–1)
BILIRUB SERPL-MCNC: 0.1 MG/DL (ref 0.2–1)
BILIRUB UR QL STRIP: NEGATIVE
BUN SERPL-MCNC: 11 MG/DL (ref 5–25)
CALCIUM SERPL-MCNC: 9.1 MG/DL (ref 8.3–10.1)
CHLORIDE SERPL-SCNC: 107 MMOL/L (ref 100–108)
CLARITY UR: CLEAR
CO2 SERPL-SCNC: 28 MMOL/L (ref 21–32)
COLOR UR: YELLOW
CREAT SERPL-MCNC: 0.71 MG/DL (ref 0.6–1.3)
EOSINOPHIL # BLD AUTO: 0.45 THOUSAND/ΜL (ref 0–0.61)
EOSINOPHIL NFR BLD AUTO: 5 % (ref 0–6)
ERYTHROCYTE [DISTWIDTH] IN BLOOD BY AUTOMATED COUNT: 13.6 % (ref 11.6–15.1)
GFR SERPL CREATININE-BSD FRML MDRD: 117 ML/MIN/1.73SQ M
GLUCOSE SERPL-MCNC: 125 MG/DL (ref 65–140)
GLUCOSE UR STRIP-MCNC: NEGATIVE MG/DL
HCT VFR BLD AUTO: 36.2 % (ref 34.8–46.1)
HGB BLD-MCNC: 11.5 G/DL (ref 11.5–15.4)
HGB UR QL STRIP.AUTO: ABNORMAL
KETONES UR STRIP-MCNC: NEGATIVE MG/DL
LEUKOCYTE ESTERASE UR QL STRIP: NEGATIVE
LYMPHOCYTES # BLD AUTO: 2.51 THOUSANDS/ΜL (ref 0.6–4.47)
LYMPHOCYTES NFR BLD AUTO: 27 % (ref 14–44)
MCH RBC QN AUTO: 25.1 PG (ref 26.8–34.3)
MCHC RBC AUTO-ENTMCNC: 31.8 G/DL (ref 31.4–37.4)
MCV RBC AUTO: 79 FL (ref 82–98)
MONOCYTES # BLD AUTO: 0.49 THOUSAND/ΜL (ref 0.17–1.22)
MONOCYTES NFR BLD AUTO: 5 % (ref 4–12)
NEUTROPHILS # BLD AUTO: 5.72 THOUSANDS/ΜL (ref 1.85–7.62)
NEUTS SEG NFR BLD AUTO: 62 % (ref 43–75)
NITRITE UR QL STRIP: NEGATIVE
NON-SQ EPI CELLS URNS QL MICRO: ABNORMAL /HPF
PH UR STRIP.AUTO: 6 [PH] (ref 4.5–8)
PLATELET # BLD AUTO: 311 THOUSANDS/UL (ref 149–390)
PMV BLD AUTO: 10.2 FL (ref 8.9–12.7)
POTASSIUM SERPL-SCNC: 4.5 MMOL/L (ref 3.5–5.3)
PROT SERPL-MCNC: 6.9 G/DL (ref 6.4–8.2)
PROT UR STRIP-MCNC: ABNORMAL MG/DL
RBC # BLD AUTO: 4.58 MILLION/UL (ref 3.81–5.12)
RBC #/AREA URNS AUTO: ABNORMAL /HPF
RH BLD: POSITIVE
SODIUM SERPL-SCNC: 140 MMOL/L (ref 136–145)
SP GR UR STRIP.AUTO: >=1.03 (ref 1–1.03)
UROBILINOGEN UR QL STRIP.AUTO: 0.2 E.U./DL
WBC # BLD AUTO: 9.22 THOUSAND/UL (ref 4.31–10.16)
WBC #/AREA URNS AUTO: ABNORMAL /HPF

## 2018-04-03 PROCEDURE — 86901 BLOOD TYPING SEROLOGIC RH(D): CPT | Performed by: EMERGENCY MEDICINE

## 2018-04-03 PROCEDURE — 99285 EMERGENCY DEPT VISIT HI MDM: CPT

## 2018-04-03 PROCEDURE — 80053 COMPREHEN METABOLIC PANEL: CPT | Performed by: EMERGENCY MEDICINE

## 2018-04-03 PROCEDURE — 86900 BLOOD TYPING SEROLOGIC ABO: CPT | Performed by: EMERGENCY MEDICINE

## 2018-04-03 PROCEDURE — 84702 CHORIONIC GONADOTROPIN TEST: CPT | Performed by: EMERGENCY MEDICINE

## 2018-04-03 PROCEDURE — 85025 COMPLETE CBC W/AUTO DIFF WBC: CPT | Performed by: EMERGENCY MEDICINE

## 2018-04-03 PROCEDURE — 36415 COLL VENOUS BLD VENIPUNCTURE: CPT | Performed by: EMERGENCY MEDICINE

## 2018-04-03 PROCEDURE — 81001 URINALYSIS AUTO W/SCOPE: CPT

## 2018-04-03 PROCEDURE — 76815 OB US LIMITED FETUS(S): CPT

## 2018-04-03 NOTE — ED PROVIDER NOTES
History  Chief Complaint   Patient presents with    Abdominal Pain Pregnant     Pt states she recently found out she was pregnant (3weeks 3 days) and began with light spotting for the last week  Has right sided pelvic pressure/pain  Advised to come in by [facility] for r/o ectopic  40-year-old female recently found out she was pregnant yesterday, mentioned to her OB that she was having some light spotting  Was sent for outpatient blood work, was told it is too early for an ultrasound, and was told to come to the emergency department should she have any abdominal pain  Patient states she has been having this right-sided pelvic pressure, and I think I got myself all worked "[employer] and presents today to Leah graham checked out  Su Tillman History provided by:  Patient  Abdominal Pain   Pain location:  RLQ  Pain quality: pressure    Pain radiates to:  Does not radiate  Pain severity:  Mild  Onset quality:  Unable to specify  Timing:  Intermittent  Chronicity:  New  Context: not awakening from sleep    Relieved by:  None tried  Worsened by:  Nothing  Ineffective treatments:  None tried  Associated symptoms: vaginal bleeding (Light pink spotting)    Associated symptoms: no anorexia, no chest pain, no chills, no constipation, no diarrhea, no dysuria, no fatigue, no fever, no hematuria, no nausea, no shortness of breath and no sore throat    Risk factors: obesity and pregnancy    Risk factors: no aspirin use and has not had multiple surgeries        None       Past Medical History:   Diagnosis Date    A[facility]fib ([facility] )     PCOS (polycystic ovarian syndrome)        Past Surgical History:   Procedure Laterality Date    OOPHORECTOMY         History reviewed  No pertinent family history  I have reviewed and agree with the history as documented      Social History   Substance Use Topics    Smoking status: Former Smoker     Packs/day: 0 20    Smokeless tobacco: Never Used    Alcohol use No        Review of Systems   Constitutional: Negative for chills, fatigue and fever  HENT: Negative for postnasal drip, sore throat and trouble swallowing  Respiratory: Negative for chest tightness and shortness of breath  Cardiovascular: Negative for chest pain  Gastrointestinal: Positive for abdominal pain  Negative for anorexia, constipation, diarrhea and nausea  Genitourinary: Positive for vaginal bleeding (Light pink spotting)  Negative for dysuria and hematuria  Musculoskeletal: Negative for back pain  Skin: Negative for rash  Allergic/Immunologic: Negative for immunocompromised state  Neurological: Negative for dizziness, light-headedness and headaches  Psychiatric/Behavioral: Negative for confusion  Physical Exam  ED Triage Vitals   Temperature Pulse Respirations Blood Pressure SpO2   04/03/18 1850 04/03/18 1850 04/03/18 1850 04/03/18 1850 04/03/18 1850   98 4 °F (36 9 °C) 71 16 (!) 182/77 99 %      Temp src Heart Rate Source Patient Position - Orthostatic VS BP Location FiO2 (%)   -- 04/03/18 2010 04/03/18 2010 04/03/18 2010 --    Monitor Sitting Left arm       Pain Score       04/03/18 1850       No Pain           Orthostatic Vital Signs  Vitals:    04/03/18 1850 04/03/18 1854 04/03/18 2010 04/03/18 2210   BP: (!) 182/77 158/67 140/68 150/62   Pulse: 71  75 70   Patient Position - Orthostatic VS:   Sitting Sitting       Physical Exam   Constitutional: She is oriented to person, place, and time  She appears well-developed and well-nourished  HENT:   Head: Normocephalic and atraumatic  Mouth/Throat: Uvula is midline, oropharynx is clear and moist and mucous membranes are normal  No tonsillar exudate  Eyes: Pupils are equal, round, and reactive to light  Neck: Normal range of motion  Neck supple  Cardiovascular: Normal rate and regular rhythm  Pulmonary/Chest: Effort normal and breath sounds normal    Abdominal: Soft  Bowel sounds are normal  There is no tenderness  There is no rebound and no guarding  Musculoskeletal: Normal range of motion  Neurological: She is alert and oriented to person, place, and time  Patient moving all extremities equally, no focal neuro deficits noted  Skin: Skin is warm and dry  Psychiatric: She has a normal mood and affect  Nursing note and vitals reviewed  ED Medications  Medications - No data to display    Diagnostic Studies  Results Reviewed     Procedure Component Value Units Date/Time    Quantitative hCG [[id number]]  (Abnormal) Collected:  04/03/18 1950    Lab Status:  Final result Specimen:  Blood from Arm, Right Updated:  04/03/18 2017     HCG, Quant 433 (H) mIU/mL     Narrative:          Expected Ranges:     Approximate               Approximate HCG  Gestation age          Concentration ( mIU/mL)  _____________          ______________________   Gloria Rebollar                      HCG values  0 2-1                       5-50  1-2                           2-3                         100-5000  3-4                         500-78113  4-5                         1000-68106  5-6                         25072-327128  6-8                         [id number]  8-12                        33090-267740    Comprehensive metabolic panel [[id number]]  (Abnormal) Collected:  04/03/18 1950    Lab Status:  Final result Specimen:  Blood from Arm, Right Updated:  04/03/18 2016     Sodium 140 mmol/L      Potassium 4 5 mmol/L      Chloride 107 mmol/L      CO2 28 mmol/L      Anion Gap 5 mmol/L      BUN 11 mg/dL      Creatinine 0 71 mg/dL      Glucose 125 mg/dL      Calcium 9 1 mg/dL      AST 19 U/L      ALT 32 U/L      Alkaline Phosphatase 119 (H) U/L      Total Protein 6 9 g/dL      Albumin 3 3 (L) g/dL      Total Bilirubin 0 10 (L) mg/dL      eGFR 117 ml/min/1 73sq m     Narrative:         National Kidney Disease Education Program recommendations are as follows:  GFR calculation is accurate only with a steady state creatinine  Chronic Kidney disease less than 60 ml/min/1 73 sq  meters  Kidney failure less than 15 ml/min/1 73 sq  meters  CBC and differential [[id number]]  (Abnormal) Collected:  04/03/18 1950    Lab Status:  Final result Specimen:  Blood from Arm, Right Updated:  04/03/18 1957     WBC 9 22 Thousand/uL      RBC 4 58 Million/uL      Hemoglobin 11 5 g/dL      Hematocrit 36 2 %      MCV 79 (L) fL      MCH 25 1 (L) pg      MCHC 31 8 g/dL      RDW 13 6 %      MPV 10 2 fL      Platelets 960 Thousands/uL      Neutrophils Relative 62 %      Lymphocytes Relative 27 %      Monocytes Relative 5 %      Eosinophils Relative 5 %      Basophils Relative 1 %      Neutrophils Absolute 5 72 Thousands/µL      Lymphocytes Absolute 2 51 Thousands/µL      Monocytes Absolute 0 49 Thousand/µL      Eosinophils Absolute 0 45 Thousand/µL      Basophils Absolute 0 05 Thousands/µL     Urine Microscopic [[id number]]  (Abnormal) Collected:  04/03/18 1931    Lab Status:  Final result Specimen:  Urine from Urine, Clean Catch Updated:  04/03/18 1946     RBC, UA 1-2 (A) /hpf      WBC, UA 4-10 (A) /hpf      Epithelial Cells Moderate (A) /hpf      Bacteria, UA Moderate (A) /hpf     ED Urine Macroscopic [[id number]]  (Abnormal) Collected:  04/03/18 1931    Lab Status:  Final result Specimen:  Urine Updated:  04/03/18 1932     Color, UA Yellow     Clarity, UA Clear     pH, UA 6 0     Leukocytes, UA Negative     Nitrite, UA Negative     Protein, UA Trace (A) mg/dl      Glucose, UA Negative mg/dl      Ketones, UA Negative mg/dl      Urobilinogen, UA 0 2 E U /dl      Bilirubin, UA Negative     Blood, UA Moderate (A)     Specific Gravity, UA >=1 030    Narrative:       CLINITEK RESULT                 US OB pregnancy limited with transvaginal   Final Result by Arianna Hong MD (04/03 2112)       3 7 x 2 9 x 3 3 cm round structure adjacent to the left ovary suspicious for possible ectopic pregnancy  No IUP identified  Small amount of free fluid                   I personally discussed this study with Dr Ben Balbuena on 4/3/2018 at 9:12 PM                      Workstation performed: [id number]                    Procedures  Procedures       Phone Contacts  ED Phone Contact    ED Course  ED Course                                MDM  Number of Diagnoses or Management Options  Abdominal pain: new and requires workup  Ectopic pregnancy: new and requires workup  Pregnant: new and requires workup  Diagnosis management comments: 7:39 PM  Explain to the patient that due to the fact that she is less than 4 weeks pregnant by dates we are unlikely to see me thing by ultrasound today  Will check another beta HCG and do an ultrasound regardless, but if it is negative she is going to need to follow up with her OBGYN on Thursday  At this point she states that she artery has an appointment for repeat blood work done on Thursday because of these problems that she discussed with her OB today  She said "the OB told me that it is too early to do an ultrasound"  I explained that that is true that her beta HCG is likely too low for us to see anything, however she presents the emergency room to "rule out ectopic" however we are unable to completely rule out an ectopic pregnancy with an ultrasound that does not show an IUP due to a low beta HCG  So again I stressed the importance of close follow-up with her OBGYN     8:10 PM  Reviewed care everywhere labs from [facility] for [facility]  Patient has had 3 serial HCGs done in the last week  3/27 it was 9, 3/30 it was 53 and today it was 351  Waiting for Us  Will sign out to Dr Nathanael Manuel for final disposition after Us  Patient remains comfortable and hemodynamically stable           Amount and/or Complexity of Data Reviewed  Clinical lab tests: ordered and reviewed  Tests in the radiology section of CPT®: ordered and reviewed  Tests in the medicine section of CPT®: reviewed and ordered  Decide to obtain previous medical records or to obtain history from someone other than the patient: yes  Review and summarize past medical records: yes  Independent visualization of images, tracings, or specimens: yes    Risk of Complications, Morbidity, and/or Mortality  Presenting problems: high  Diagnostic procedures: high  Management options: high    Patient Progress  Patient progress: stable    CritCare Time    Disposition  Final diagnoses:   Pregnant   Abdominal pain   Ectopic pregnancy     Time reflects when diagnosis was documented in both MDM as applicable and the Disposition within this note     Time User Action Codes Description Comment    4/3/2018  7:41 PM Joshua Lrn Add [Z34 90] Pregnant     4/3/2018  7:41 PM Eric Morales [R10 9] Abdominal pain     4/3/2018 10:25 PM Cony De Los Santos Add [O00 90] Ectopic pregnancy       ED Disposition     ED Disposition Condition Comment    Transfer to Another [address] [place] [address] should be transferred out to [address] [place] [address] [address] [facility]      MD Documentation    Pavon Kiss Most Recent Value   Patient Condition  The patient has been stabilized such that within reasonable medical probability, no material deterioration of the patient condition or the condition of the unborn child(criss) is likely to result from the transfer   Benefits of Transfer  Continuity of care   Risks of Transfer  Potential for delay in receiving treatment, Potential deterioration of medical condition, Increased discomfort during transfer   Accepting Physician  diane   Sending MD  herring      Follow-up Information     Follow up With Specialties Details Why [address] [facility] and Gynecology Schedule an appointment as soon as possible for a visit in 2 days For repeat blood work and possible ultrasound [address] Emergency Department Emergency Medicine  If symptoms worsen [address] [facility] [address] ED, [address]  [address] [place], [place] [facility], [address] [facility], [address]        Discharge Medication List as of 4/3/2018  8:53 PM      CONTINUE these medications which have NOT CHANGED    Details   metoprolol tartrate (LOPRESSOR) 25 mg tablet Take 12 5 mg by mouth every 12 (twelve) hours, Historical Med           No discharge procedures on file      ED Provider  Electronically Signed by           Herminia Fernando DO  04/05/18 0761

## 2018-04-04 NOTE — ED PROVIDER NOTES
History  Chief Complaint   Patient presents with    Abdominal Pain Pregnant     Pt states she recently found out she was pregnant (3weeks 3 days) and began with light spotting for the last week  Has right sided pelvic pressure/pain  Advised to come in by [facility] for r/o ectopic  HPI    None       Past Medical History:   Diagnosis Date    A-fib ([facility] [address] )     PCOS (polycystic ovarian syndrome)        Past Surgical History:   Procedure Laterality Date    OOPHORECTOMY         History reviewed  No pertinent family history  I have reviewed and agree with the history as documented      Social History   Substance Use Topics    Smoking status: Former Smoker     Packs/day: 0 20    Smokeless tobacco: Never Used    Alcohol use No        Review of Systems    Physical Exam  ED Triage Vitals   Temperature Pulse Respirations Blood Pressure SpO2   04/03/18 1850 04/03/18 1850 04/03/18 1850 04/03/18 1850 04/03/18 1850   98 4 °F (36 9 °C) 71 16 (!) 182/77 99 %      Temp src Heart Rate Source Patient Position - Orthostatic VS BP Location FiO2 (%)   -- 04/03/18 2010 04/03/18 2010 04/03/18 2010 --    Monitor Sitting Left arm       Pain Score       04/03/18 1850       No Pain           Orthostatic Vital Signs  Vitals:    04/03/18 1850 04/03/18 1854 04/03/18 2010 04/03/18 2210   BP: (!) 182/77 158/67 140/68 150/62   Pulse: 71  75 70   Patient Position - Orthostatic VS:   Sitting Sitting       Physical Exam    ED Medications  Medications - No data to display    Diagnostic Studies  Results Reviewed     Procedure Component Value Units Date/Time    Quantitative hCG [[id number]]  (Abnormal) Collected:  04/03/18 1950    Lab Status:  Final result Specimen:  Blood from Arm, Right Updated:  04/03/18 2017     HCG, Quant 433 (H) mIU/mL     Narrative:          Expected Ranges:     Approximate               Approximate HCG  Gestation age          Concentration ( mIU/mL)  _____________          ______________________   Arlene Limaky                      HCG values  0 2-1                       5-50  1-2                           2-3                         100-5000  3-4                         500-85891  4-5                         1000-07752  5-6                         [id number]  6-8                         67120-292696  8-12                        58630-748284    Comprehensive metabolic panel [[id number]]  (Abnormal) Collected:  04/03/18 1950    Lab Status:  Final result Specimen:  Blood from Arm, Right Updated:  04/03/18 2016     Sodium 140 mmol/L      Potassium 4 5 mmol/L      Chloride 107 mmol/L      CO2 28 mmol/L      Anion Gap 5 mmol/L      BUN 11 mg/dL      Creatinine 0 71 mg/dL      Glucose 125 mg/dL      Calcium 9 1 mg/dL      AST 19 U/L      ALT 32 U/L      Alkaline Phosphatase 119 (H) U/L      Total Protein 6 9 g/dL      Albumin 3 3 (L) g/dL      Total Bilirubin 0 10 (L) mg/dL      eGFR 117 ml/min/1 73sq m     Narrative:         National Kidney Disease Education Program recommendations are as follows:  GFR calculation is accurate only with a steady state creatinine  Chronic Kidney disease less than 60 ml/min/1 73 sq  meters  Kidney failure less than 15 ml/min/1 73 sq  meters      CBC and differential [[id number]]  (Abnormal) Collected:  04/03/18 1950    Lab Status:  Final result Specimen:  Blood from Arm, Right Updated:  04/03/18 1957     WBC 9 22 Thousand/uL      RBC 4 58 Million/uL      Hemoglobin 11 5 g/dL      Hematocrit 36 2 %      MCV 79 (L) fL      MCH 25 1 (L) pg      MCHC 31 8 g/dL      RDW 13 6 %      MPV 10 2 fL      Platelets 401 Thousands/uL      Neutrophils Relative 62 %      Lymphocytes Relative 27 %      Monocytes Relative 5 %      Eosinophils Relative 5 %      Basophils Relative 1 %      Neutrophils Absolute 5 72 Thousands/µL      Lymphocytes Absolute 2 51 Thousands/µL      Monocytes Absolute 0 49 Thousand/µL      Eosinophils Absolute 0 45 Thousand/µL      Basophils Absolute 0 05 Thousands/µL     Urine Microscopic [[id number]]  (Abnormal) Collected:  04/03/18 1931    Lab Status:  Final result Specimen:  Urine from Urine, Clean Catch Updated:  04/03/18 1946     RBC, UA 1-2 (A) /hpf      WBC, UA 4-10 (A) /hpf      Epithelial Cells Moderate (A) /hpf      Bacteria, UA Moderate (A) /hpf     ED Urine Macroscopic [[id number]]  (Abnormal) Collected:  04/03/18 1931    Lab Status:  Final result Specimen:  Urine Updated:  04/03/18 1932     Color, UA Yellow     Clarity, UA Clear     pH, UA 6 0     Leukocytes, UA Negative     Nitrite, UA Negative     Protein, UA Trace (A) mg/dl      Glucose, UA Negative mg/dl      Ketones, UA Negative mg/dl      Urobilinogen, UA 0 2 E U /dl      Bilirubin, UA Negative     Blood, UA Moderate (A)     Specific Gravity, UA >=1 030    Narrative:       CLINITEK RESULT                 US OB pregnancy limited with transvaginal   Final Result by Lenny Onofre MD (04/03 2112)       3 7 x 2 9 x 3 3 cm round structure adjacent to the left ovary suspicious for possible ectopic pregnancy  No IUP identified  Small amount of free fluid  I personally discussed this study with Dr Sue Christianson on 4/3/2018 at 9:12 PM                      Workstation performed: [id number]                    Procedures  Procedures       Phone Contacts  ED Phone Contact    ED Course  ED Course as of Apr 03 2227   Tue Apr 03, 2018 2117  Called by radiologist who advises me there is suspicion for a left-sided ectopic pregnancy  Pending call back from patient's private OBGYN who works out of [facility]     2158   Discussed with Dr Dallas Rowe  of OBGYN covering for [facility]   She has accepted this patient in transport  Disposition and transferred to [facility] pending  Patient remains stable and is refusing analgesics                                  MDM  CritCare Time    Disposition  Final diagnoses:   Pregnant   Abdominal pain   Ectopic pregnancy     Time reflects when diagnosis was documented in both MDM as applicable and the Disposition within this note     Time User Action Codes Description Comment    4/3/2018  7:41 PM Mark Dacia [D32 73] Pregnant     4/3/2018  7:41 PM Mark Dacia [R10 9] Abdominal pain     4/3/2018 10:25 PM Maci De Los Santos Add [V78 41] Ectopic pregnancy       ED Disposition     ED Disposition Condition Comment    Transfer to Another [facility] should be transferred out to [address] [facility]      MD Documentation    [address] [facility] Most Recent Value   Patient Condition  The patient has been stabilized such that within reasonable medical probability, no material deterioration of the patient condition or the condition of the unborn child(criss) is likely to result from the transfer   Benefits of Transfer  Continuity of care   Risks of Transfer  Potential for delay in receiving treatment, Potential deterioration of medical condition, Increased discomfort during transfer   Accepting Physician  diane   Sending MD  herring      Follow-up Information     Follow up With Specialties Details Why [address] [facility] Schedule an appointment as soon as possible for a visit in 2 days For repeat blood work and possible ultrasound [address] Emergency Department Emergency Medicine  If symptoms worsen [address] [facility]  AN ED, [address], [place], [place], [address]        Patient's Medications   Discharge Prescriptions    No medications on file     No discharge procedures on file      ED Provider  Electronically Signed by           Shari Galicia MD  04/03/18 3424

## 2018-04-04 NOTE — EMTALA/ACUTE CARE TRANSFER
[address] [place] [address]  Dept: [phone]      EMTALA TRANSFER CONSENT    NAME Mary Jj                                         1990                              MRN [id number]    I have been informed of my rights regarding examination, treatment, and transfer   by Dr Magda Farley MD    Benefits: Continuity of care    Risks: Potential for delay in receiving treatment, Potential deterioration of medical condition, Increased discomfort during transfer      Transfer Request   I acknowledge that my medical condition has been evaluated and explained to me by the emergency department physician or other qualified medical person and/or my attending physician who has recommended and offered to me further medical examination and treatment  I understand the Hospital's obligation with respect to the treatment and stabilization of my emergency medical condition  I nevertheless request to be transferred  I release the Hospital, the doctor, and any other persons caring for me from all responsibility or liability for any injury or ill effects that may result from my transfer and agree to accept all responsibility for the consequences of my choice to transfer, rather than receive stabilizing treatment at the Hospital  I understand that because the transfer is my request, my insurance may not provide reimbursement for the services  The Hospital will assist and direct me and my family in how to make arrangements for transfer, but the hospital is not liable for any fees charged by the transport service  In spite of this understanding, I refuse to consent to further medical examination and treatment which has been offered to me, and request transfer to    I authorize the performance of emergency medical procedures and treatments upon me in both transit and upon arrival at the receiving facility    Additionally, I authorize the release of any and all medical records to the receiving facility and request they be transported with me, if possible  I authorize the performance of emergency medical procedures and treatments upon me in both transit and upon arrival at the receiving facility  Additionally, I authorize the release of any and all medical records to the receiving facility and request they be transported with me, if possible  I understand that the safest mode of transportation during a medical emergency is an ambulance and that the Hospital advocates the use of this mode of transport  Risks of traveling to the receiving facility by car, including absence of medical control, life sustaining equipment, such as oxygen, and medical personnel has been explained to me and I fully understand them  (RONY CORRECT BOX BELOW)  [  ]  I consent to the stated transfer and to be transported by ambulance/helicopter  [  ]  I consent to the stated transfer, but refuse transportation by ambulance and accept full responsibility for my transportation by car  I understand the risks of non-ambulance transfers and I exonerate the Hospital and its staff from any deterioration in my condition that results from this refusal     X___________________________________________    DATE  18  TIME________  Signature of patient or legally responsible individual signing on patient behalf           RELATIONSHIP TO PATIENT_________________________          Provider Certification    NAME Carmen Giraldo                                        SHADI 1990                              MRN [id number]    A medical screening exam was performed on the above named patient  Based on the examination:    Condition Necessitating Transfer The primary encounter diagnosis was Pregnant  Diagnoses of Abdominal pain and Ectopic pregnancy were also pertinent to this visit      Patient Condition: The patient has been stabilized such that within reasonable medical probability, no material deterioration of the patient condition or the condition of the unborn child(criss) is likely to result from the transfer    Reason for Transfer:      Transfer Requirements: Facility     · Space available and qualified personnel available for treatment as acknowledged by    · Agreed to accept transfer and to provide appropriate medical treatment as acknowledged by       [facility]  · Appropriate medical records of the examination and treatment of the patient are provided at the time of transfer   [facility], [address] _______  · Transfer will be performed by qualified personnel from    and appropriate transfer equipment as required, including the use of necessary and appropriate life support measures  Provider Certification: I have examined the patient and explained the following risks and benefits of being transferred/refusing transfer to the patient/family:         Based on these reasonable risks and benefits to the patient and/or the unborn child(criss), and based upon the information available at the time of the patients examination, I certify that the medical benefits reasonably to be expected from the provision of appropriate medical treatments at another medical facility outweigh the increasing risks, if any, to the individuals medical condition, and in the case of labor to the unborn child, from effecting the transfer      X____________________________________________ DATE 04/03/18        TIME_______      ORIGINAL - SEND TO MEDICAL RECORDS   COPY - SEND WITH PATIENT DURING TRANSFER

## 2018-04-04 NOTE — DISCHARGE INSTRUCTIONS
Abdominal Pain in Pregnancy   WHAT YOU NEED TO KNOW:   Abdominal pain during pregnancy is common  Some of the causes include heartburn, constipation, gas, false labor, and round ligament pain  Round ligament pain is caused by stretching of the ligaments that support your uterus  Abdominal pain may be caused by a health problem, such as a stomach virus or appendicitis (inflammation of the appendix)  The pain may also be caused by a problem with your pregnancy, such as a threatened miscarriage or  labor  DISCHARGE INSTRUCTIONS:   Follow up with your obstetrician within 3 days:  Write down your questions so you remember to ask them during your visits  Self-care:   · Rest may help to relieve round ligament pain  Ask your healthcare provider about other ways to relieve this pain, such as a supportive belt or pregnancy exercises  · Use a heating pad set to the lowest setting or a hot compress to apply heat to your abdomen  Do this for 20 to 30 minutes every 2 hours for as many days as directed  · Avoid quick changes in position or movements that cause pain  · Do not lie flat in bed or bend over if you have heartburn  Ask your obstetrician if you should make any changes to the foods you eat  Ask if you can take any medicines for heartburn  · Eat more fiber and drink more liquids to relieve constipation  Fiber is found in fruits, vegetables, and whole-grain foods, such as whole-wheat bread and cereals  Ask how much liquid to drink each day and which liquids are best for you  Contact your obstetrician if:  · You continue to have abdominal pain that cannot be relieved  · You have a fever  · You have questions or concerns about your condition or care  Return to the emergency department if:   · You have sudden, severe pain or cramps that are so bad that you cannot walk or talk  · You have a fast heartbeat, shortness of breath, and you feel lightheaded or faint       · You have vaginal bleeding or discharge  · You have nausea, vomiting, fever, and severe pain on your right side  © 2017 [address] [employer] Information is for End User's use only and may not be sold, redistributed or otherwise used for commercial purposes  All illustrations and images included in CareNotes® are the copyrighted property of A D A M [employer]  or [employer] Hodge  The above information is an  only  It is not intended as medical advice for individual conditions or treatments  Talk to your doctor, nurse or pharmacist before following any medical regimen to see if it is safe and effective for you  Pelvic Pain   WHAT YOU NEED TO KNOW:   Pelvic pain may be caused by a number of conditions, such as irritable bowel syndrome, appendicitis, or constipation  A urinary tract infection, prostate inflammation, menstrual cramps, or kidney stones can also cause pelvic pain  You may have pain on one or both sides of your pelvis  Pelvic pain can develop if you have trauma to your pelvis or if you sit or stand for a long time  DISCHARGE INSTRUCTIONS:   Medicines: You may need any of the following:  · NSAIDs , such as ibuprofen, help decrease swelling, pain, and fever  This medicine is available with or without a doctor's order  NSAIDs can cause stomach bleeding or kidney problems in certain people  If you take blood thinner medicine, always ask your healthcare provider if NSAIDs are safe for you  Always read the medicine label and follow directions  · Prescription pain medicine  may be given  Ask how to take this medicine safely  · Birth control medicines  may help decrease pain in women  · Take your medicine as directed  Contact your healthcare provider if you think your medicine is not helping or if you have side effects  Tell him or her if you are allergic to any medicine  Keep a list of the medicines, vitamins, and herbs you take  Include the amounts, and when and why you take them  Bring the list or the pill bottles to follow-up visits  Carry your medicine list with you in case of an emergency  Call 911 for any of the following:   · You have severe chest pain and sudden trouble breathing  Contact your healthcare provider if:   · You have a fever  · You have nausea, vomiting, or diarrhea  · Your pain does not go away, or it gets worse, even after treatment  · You have numbness in your legs or toes  · You have heavy or unusual vaginal bleeding  · You have questions or concerns about your condition or care  Manage your pelvic pain:   · Keep a pain record  Write down when your pain happens and how severe it is  Include any other symptoms you have with your pain  A record will help you keep track of pain cycles  Bring the record with you to your follow-up visits  It may also help your healthcare provider find out what is causing your pain  · Learn ways to relax  Deep breathing, meditation, and relaxation techniques can help decrease your pain  When you are tense, your pain may increase  · Treat or prevent constipation  Drink liquids as directed  You may need to drink more liquid than usual  Ask your healthcare provider how much liquid to drink each day  Eat high-fiber foods  High-fiber foods include fruit, vegetables, whole-grain breads and cereals, and beans  You may need to change the foods you eat if you have irritable bowel syndrome  Follow up with your healthcare provider as directed: You may need physical therapy  You may need to see an orthopedic specialist  Write down your questions so you remember to ask them during your visits  © 2017 [address] [employer] Information is for End User's use only and may not be sold, redistributed or otherwise used for commercial purposes  All illustrations and images included in CareNotes® are the copyrighted property of [employer] , [employer]  or Jordan Hodge  The above information is an  only   It is not intended as medical advice for individual conditions or treatments  Talk to your doctor, nurse or pharmacist before following any medical regimen to see if it is safe and effective for you

## 2018-04-04 NOTE — ED NOTES
Called [facility] for Dr to speak with patient's OB/GYN     Teodora Perez Dell  04/03/18 3610       Anika BECKWITH [facility]  04/03/18 8834

## 2018-04-07 ENCOUNTER — HOSPITAL ENCOUNTER (EMERGENCY)
Facility: HOSPITAL | Age: 28
Discharge: HOME | End: 2018-04-08
Attending: EMERGENCY MEDICINE
Payer: COMMERCIAL

## 2018-04-07 VITALS
BODY MASS INDEX: 43.79 KG/M2 | WEIGHT: 288 LBS | SYSTOLIC BLOOD PRESSURE: 169 MMHG | HEART RATE: 90 BPM | OXYGEN SATURATION: 100 % | DIASTOLIC BLOOD PRESSURE: 74 MMHG | RESPIRATION RATE: 18 BRPM

## 2018-04-07 DIAGNOSIS — R07.9 CHEST PAIN: Primary | ICD-10-CM

## 2018-04-07 DIAGNOSIS — K21.9 GERD (GASTROESOPHAGEAL REFLUX DISEASE): ICD-10-CM

## 2018-04-07 LAB
BASOPHILS # BLD AUTO: 0.1 THOUSANDS/ΜL (ref 0–0.1)
BASOPHILS NFR BLD AUTO: 1 % (ref 0–1)
EOSINOPHIL # BLD AUTO: 0.3 THOUSAND/ΜL (ref 0–0.61)
EOSINOPHIL NFR BLD AUTO: 3 % (ref 0–6)
ERYTHROCYTE [DISTWIDTH] IN BLOOD BY AUTOMATED COUNT: 13.4 % (ref 11.6–15.1)
HCT VFR BLD AUTO: 36.1 % (ref 37–47)
HGB BLD-MCNC: 12.3 G/DL (ref 12–16)
LYMPHOCYTES # BLD AUTO: 3 THOUSANDS/ΜL (ref 0.6–4.47)
LYMPHOCYTES NFR BLD AUTO: 25 % (ref 14–44)
MCH RBC QN AUTO: 26.4 PG (ref 27–31)
MCHC RBC AUTO-ENTMCNC: 34.1 G/DL (ref 31.4–37.4)
MCV RBC AUTO: 77 FL (ref 82–98)
MONOCYTES # BLD AUTO: 0.9 THOUSAND/ΜL (ref 0.17–1.22)
MONOCYTES NFR BLD AUTO: 8 % (ref 4–12)
NEUTROPHILS # BLD AUTO: 7.4 THOUSANDS/ΜL (ref 1.85–7.62)
NEUTS SEG NFR BLD AUTO: 63 % (ref 43–75)
PLATELET # BLD AUTO: 372 THOUSANDS/UL (ref 130–400)
PMV BLD AUTO: 7.9 FL (ref 8.9–12.7)
RBC # BLD AUTO: 4.66 MILLION/UL (ref 4.2–5.4)
WBC # BLD AUTO: 11.7 THOUSAND/UL (ref 4.8–10.8)

## 2018-04-07 PROCEDURE — 93005 ELECTROCARDIOGRAM TRACING: CPT

## 2018-04-07 PROCEDURE — 85730 THROMBOPLASTIN TIME PARTIAL: CPT | Performed by: EMERGENCY MEDICINE

## 2018-04-07 PROCEDURE — 85610 PROTHROMBIN TIME: CPT | Performed by: EMERGENCY MEDICINE

## 2018-04-07 PROCEDURE — 80053 COMPREHEN METABOLIC PANEL: CPT | Performed by: EMERGENCY MEDICINE

## 2018-04-07 PROCEDURE — 36415 COLL VENOUS BLD VENIPUNCTURE: CPT | Performed by: EMERGENCY MEDICINE

## 2018-04-07 PROCEDURE — 84484 ASSAY OF TROPONIN QUANT: CPT | Performed by: EMERGENCY MEDICINE

## 2018-04-07 PROCEDURE — 85025 COMPLETE CBC W/AUTO DIFF WBC: CPT | Performed by: EMERGENCY MEDICINE

## 2018-04-08 LAB
ALBUMIN SERPL BCP-MCNC: 3.5 G/DL (ref 3.5–5)
ALP SERPL-CCNC: 117 U/L (ref 46–116)
ALT SERPL W P-5'-P-CCNC: 36 U/L (ref 12–78)
ANION GAP SERPL CALCULATED.3IONS-SCNC: 6 MMOL/L (ref 4–13)
APTT PPP: 29 SECONDS (ref 24–33)
AST SERPL W P-5'-P-CCNC: 17 U/L (ref 5–45)
BILIRUB SERPL-MCNC: 0.2 MG/DL (ref 0.2–1)
BUN SERPL-MCNC: 12 MG/DL (ref 5–25)
CALCIUM SERPL-MCNC: 9.3 MG/DL (ref 8.3–10.1)
CHLORIDE SERPL-SCNC: 104 MMOL/L (ref 100–108)
CO2 SERPL-SCNC: 28 MMOL/L (ref 21–32)
CREAT SERPL-MCNC: 0.76 MG/DL (ref 0.6–1.3)
GFR SERPL CREATININE-BSD FRML MDRD: 108 ML/MIN/1.73SQ M
GLUCOSE SERPL-MCNC: 107 MG/DL (ref 65–140)
INR PPP: 1.05 (ref 0.86–1.16)
POTASSIUM SERPL-SCNC: 3.9 MMOL/L (ref 3.5–5.3)
PROT SERPL-MCNC: 7.4 G/DL (ref 6.4–8.2)
PROTHROMBIN TIME: 11 SECONDS (ref 9.4–11.7)
SODIUM SERPL-SCNC: 138 MMOL/L (ref 136–145)
TROPONIN I SERPL-MCNC: <0.02 NG/ML

## 2018-04-08 PROCEDURE — 99285 EMERGENCY DEPT VISIT HI MDM: CPT

## 2018-04-08 NOTE — DISCHARGE INSTRUCTIONS

## 2018-04-08 NOTE — ED PROVIDER NOTES
History  Chief Complaint   Patient presents with    Chest Pain     pt states she was sitting on her couch and started with cramping chest pain about 20 minutes ago  Pt has a history of afib but not on any meds secondary to being 4 weeks preg     The patient is a 70-year-old female who is about 4 weeks pregnant and has a history of chest pains  Patient states that she ate a pork chop tonight and soon after eating she had an episode of vomiting followed by the pain in the left chest that was similar to what she has had in the past   It is associated with some shortness of breath which continues at the point of arrival however patient has a S PO2 of 100% on room air  Patient recently quit smoking            Prior to Admission Medications   Prescriptions Last Dose Informant Patient Reported? Taking? Prenatal Vit-Fe Fumarate-FA (PRENATAL 1+1 PO)   Yes Yes   Sig: Take by mouth      Facility-Administered Medications: None       Past Medical History:   Diagnosis Date    [facility]     PCOS (polycystic ovarian syndrome)        Past Surgical History:   Procedure Laterality Date    OOPHORECTOMY         No family history on file  I have reviewed and agree with the history as documented  Social History   Substance Use Topics    Smoking status: Former Smoker     Packs/day: 0 20    Smokeless tobacco: Never Used    Alcohol use No        Review of Systems   Constitutional: Negative for fever  HENT: Negative for congestion  Respiratory: Positive for shortness of breath  Negative for cough  Cardiovascular: Positive for chest pain  Negative for leg swelling  Gastrointestinal: Positive for vomiting  Negative for abdominal pain and diarrhea  Genitourinary: Negative for dysuria  Musculoskeletal: Negative for back pain  Skin: Negative for rash  Neurological: Negative for syncope  All other systems reviewed and are negative        Physical Exam  ED Triage Vitals [04/07/18 2331]   Temp Pulse Respirations Blood Pressure SpO2   -- 90 18 169/74 100 %      Temp src Heart Rate Source Patient Position - Orthostatic VS BP Location FiO2 (%)   -- Monitor Lying Left arm --      Pain Score       8           Orthostatic Vital Signs  Vitals:    04/07/18 2331   BP: 169/74   Pulse: 90   Patient Position - Orthostatic VS: Lying       Physical Exam   Constitutional: She is oriented to person, place, and time  Obese female   HENT:   Head: Normocephalic and atraumatic  Eyes: Conjunctivae are normal    Neck: Normal range of motion  Neck supple  No JVD present  Cardiovascular: Normal rate, regular rhythm and normal heart sounds  Pulmonary/Chest: Breath sounds normal  She exhibits no tenderness  Abdominal: Soft  Bowel sounds are normal  There is no tenderness  Musculoskeletal: Normal range of motion  Neurological: She is alert and oriented to person, place, and time  Skin: Skin is warm and dry  She is not diaphoretic  Psychiatric: She has a normal mood and affect  Her behavior is normal    Nursing note and vitals reviewed  ED Medications  Medications - No data to display    Diagnostic Studies  Results Reviewed     Procedure Component Value Units Date/Time    Troponin I [[id number]]  (Normal) Collected:  04/07/18 2346    Lab Status:  Final result Specimen:  Blood from Arm, Right Updated:  04/08/18 0015     Troponin I <0 02 ng/mL     Narrative:         [employer] Chemistry analyzer 99% cutoff is > 0 04 ng/mL in network labs    o cTnI 99% cutoff is useful only when applied to patients in the clinical setting of myocardial ischemia  o cTnI 99% cutoff should be interpreted in the context of clinical history, ECG findings and possibly cardiac imaging to establish correct diagnosis  o cTnI 99% cutoff may be suggestive but clearly not indicative of a coronary event without the clinical setting of myocardial ischemia      Comprehensive metabolic panel [[id number]]  (Abnormal) Collected:  04/07/18 2346    Lab Status:  Final result Specimen:  Blood from Arm, Right Updated:  04/08/18 0012     Sodium 138 mmol/L      Potassium 3 9 mmol/L      Chloride 104 mmol/L      CO2 28 mmol/L      Anion Gap 6 mmol/L      BUN 12 mg/dL      Creatinine 0 76 mg/dL      Glucose 107 mg/dL      Calcium 9 3 mg/dL      AST 17 U/L      ALT 36 U/L      Alkaline Phosphatase 117 (H) U/L      Total Protein 7 4 g/dL      Albumin 3 5 g/dL      Total Bilirubin 0 20 mg/dL      eGFR 108 ml/min/1 73sq m     Narrative:         National Kidney Disease Education Program recommendations are as follows:  GFR calculation is accurate only with a steady state creatinine  Chronic Kidney disease less than 60 ml/min/1 73 sq  meters  Kidney failure less than 15 ml/min/1 73 sq  meters  Machelle Rivera [[id number]]  (Normal) Collected:  04/07/18 2346    Lab Status:  Final result Specimen:  Blood from Arm, Right Updated:  04/08/18 0009     Protime 11 0 seconds      INR 1 05    APTT [[id number]]  (Normal) Collected:  04/07/18 2346    Lab Status:  Final result Specimen:  Blood from Arm, Right Updated:  04/08/18 0009     PTT 29 seconds     Narrative:          Therapeutic Heparin Range = 60-90 seconds    CBC and differential [[id number]]  (Abnormal) Collected:  04/07/18 2346    Lab Status:  Final result Specimen:  Blood from Arm, Right Updated:  04/07/18 2357     WBC 11 70 (H) Thousand/uL      RBC 4 66 Million/uL      Hemoglobin 12 3 g/dL      Hematocrit 36 1 (L) %      MCV 77 (L) fL      MCH 26 4 (L) pg      MCHC 34 1 g/dL      RDW 13 4 %      MPV 7 9 (L) fL      Platelets 041 Thousands/uL      Neutrophils Relative 63 %      Lymphocytes Relative 25 %      Monocytes Relative 8 %      Eosinophils Relative 3 %      Basophils Relative 1 %      Neutrophils Absolute 7 40 Thousands/µL      Lymphocytes Absolute 3 00 Thousands/µL      Monocytes Absolute 0 90 Thousand/µL      Eosinophils Absolute 0 30 Thousand/µL      Basophils Absolute 0 10 Thousands/µL     UA w Reflex to Microscopic [[id number]]     Lab Status: No result Specimen:  Urine                  No orders to display              Procedures  ECG 12 Lead Documentation  Date/Time: 4/7/2018 11:34 PM  Performed by: Nitin Finn  Authorized by: Nitin Finn     Indications / Diagnosis:  Chest pain  ECG reviewed by me, the ED Provider: yes    Patient location:  ED  Interpretation:     Interpretation: normal    Rate:     ECG rate:  74    ECG rate assessment: normal    Rhythm:     Rhythm: sinus rhythm    Ectopy:     Ectopy: none    QRS:     QRS axis:  Normal    QRS intervals:  Normal  Conduction:     Conduction: normal    ST segments:     ST segments:  Normal  T waves:     T waves: normal             Phone Contacts  ED Phone Contact    ED Course  ED Course                                MDM  CritCare Time    Disposition  Final diagnoses:   Chest pain   GERD (gastroesophageal reflux disease)     Time reflects when diagnosis was documented in both MDM as applicable and the Disposition within this note     Time User Action Codes Description Comment    4/8/2018 12:35 AM Calos BECKWITH Add [R07 9] Chest pain     4/8/2018 12:35 AM Calos BECKWITH Add [K21 9] GERD (gastroesophageal reflux disease)       ED Disposition     ED Disposition Condition Comment    Discharge  Tompa U  66  discharge to home/self care  Condition at discharge: Stable        Follow-up Information     Follow up With Specialties Details Why Contact Info    Naveed Harrington DO  Schedule an appointment as soon as possible for a visit in 1 day  [facility]  [address]  [phone]          Patient's Medications   Discharge Prescriptions    No medications on file     No discharge procedures on file      ED Provider  Electronically Signed by           Puja Clarke MD  04/08/18 1332

## 2018-04-09 LAB
ATRIAL RATE: 74 BPM
P AXIS: 48 DEGREES
PR INTERVAL: 132 MS
QRS AXIS: -1 DEGREES
QRSD INTERVAL: 108 MS
QT INTERVAL: 390 MS
QTC INTERVAL: 432 MS
T WAVE AXIS: 22 DEGREES
VENTRICULAR RATE: 74 BPM

## 2018-04-09 PROCEDURE — 93010 ELECTROCARDIOGRAM REPORT: CPT | Performed by: INTERNAL MEDICINE

## 2018-04-24 ENCOUNTER — APPOINTMENT (EMERGENCY)
Dept: ULTRASOUND IMAGING | Facility: HOSPITAL | Age: 28
End: 2018-04-24
Payer: COMMERCIAL

## 2018-04-24 ENCOUNTER — HOSPITAL ENCOUNTER (EMERGENCY)
Facility: HOSPITAL | Age: 28
Discharge: HOME | End: 2018-04-24
Attending: EMERGENCY MEDICINE
Payer: COMMERCIAL

## 2018-04-24 VITALS
DIASTOLIC BLOOD PRESSURE: 62 MMHG | SYSTOLIC BLOOD PRESSURE: 133 MMHG | HEART RATE: 77 BPM | RESPIRATION RATE: 18 BRPM | OXYGEN SATURATION: 98 % | TEMPERATURE: 97.4 F

## 2018-04-24 DIAGNOSIS — O20.9 VAGINAL BLEEDING AFFECTING EARLY PREGNANCY: ICD-10-CM

## 2018-04-24 DIAGNOSIS — O20.0 THREATENED MISCARRIAGE IN EARLY PREGNANCY: Primary | ICD-10-CM

## 2018-04-24 LAB
B-HCG SERPL-ACNC: ABNORMAL MIU/ML
BASOPHILS # BLD AUTO: 0.06 THOUSANDS/ΜL (ref 0–0.1)
BASOPHILS NFR BLD AUTO: 1 % (ref 0–1)
EOSINOPHIL # BLD AUTO: 0.35 THOUSAND/ΜL (ref 0–0.61)
EOSINOPHIL NFR BLD AUTO: 4 % (ref 0–6)
ERYTHROCYTE [DISTWIDTH] IN BLOOD BY AUTOMATED COUNT: 13.9 % (ref 11.6–15.1)
HCT VFR BLD AUTO: 37.7 % (ref 34.8–46.1)
HGB BLD-MCNC: 12.2 G/DL (ref 11.5–15.4)
LYMPHOCYTES # BLD AUTO: 1.87 THOUSANDS/ΜL (ref 0.6–4.47)
LYMPHOCYTES NFR BLD AUTO: 19 % (ref 14–44)
MCH RBC QN AUTO: 25.3 PG (ref 26.8–34.3)
MCHC RBC AUTO-ENTMCNC: 32.4 G/DL (ref 31.4–37.4)
MCV RBC AUTO: 78 FL (ref 82–98)
MONOCYTES # BLD AUTO: 0.63 THOUSAND/ΜL (ref 0.17–1.22)
MONOCYTES NFR BLD AUTO: 6 % (ref 4–12)
NEUTROPHILS # BLD AUTO: 7.07 THOUSANDS/ΜL (ref 1.85–7.62)
NEUTS SEG NFR BLD AUTO: 70 % (ref 43–75)
PLATELET # BLD AUTO: 319 THOUSANDS/UL (ref 149–390)
PMV BLD AUTO: 9.9 FL (ref 8.9–12.7)
RBC # BLD AUTO: 4.83 MILLION/UL (ref 3.81–5.12)
WBC # BLD AUTO: 9.98 THOUSAND/UL (ref 4.31–10.16)

## 2018-04-24 PROCEDURE — 84702 CHORIONIC GONADOTROPIN TEST: CPT | Performed by: EMERGENCY MEDICINE

## 2018-04-24 PROCEDURE — 85025 COMPLETE CBC W/AUTO DIFF WBC: CPT | Performed by: EMERGENCY MEDICINE

## 2018-04-24 PROCEDURE — 76801 OB US < 14 WKS SINGLE FETUS: CPT

## 2018-04-24 PROCEDURE — 36415 COLL VENOUS BLD VENIPUNCTURE: CPT | Performed by: EMERGENCY MEDICINE

## 2018-04-24 PROCEDURE — 99284 EMERGENCY DEPT VISIT MOD MDM: CPT

## 2018-04-24 NOTE — DISCHARGE INSTRUCTIONS
Threatened Miscarriage   WHAT YOU NEED TO KNOW:   A threatened miscarriage occurs when you have vaginal bleeding within the first 20 weeks of pregnancy  It means that a miscarriage may happen  A threatened miscarriage may also be called a threatened   DISCHARGE INSTRUCTIONS:   Return to the emergency department if:   · You feel weak or faint  · Your pain or cramping in your abdomen or back gets worse  · You have vaginal bleeding that soaks 1 or more pads in an hour  · You pass material that looks like tissue or large clots  Contact your healthcare provider or obstetrician if:   · You have a fever  · You have trouble urinating, burning when you urinate, or feel a need to urinate often  · You have new or worsening vaginal bleeding  · You have vaginal pain or itching, or vaginal discharge that is yellow, green, or foul-smelling  · You have questions or concerns about your condition or care  Self-care: The following may help you manage your symptoms and decrease your risk for a miscarriage:  · Do not put anything in your vagina  Do not have sex, douche, or use tampons  These actions may increase your risk for infection and miscarriage  · Rest as directed  Do not exercise or do strenuous activities  These activities may cause  labor or miscarriage  Ask your healthcare provider what activities are okay to do  Stay healthy during pregnancy:   · Eat a variety of healthy foods  Healthy foods can help you get extra protein, water, and calories that you need while you are pregnant  Healthy foods include fruits, vegetables, whole-grain breads, low-fat dairy products, beans, lean meats, and fish  Avoid raw or undercooked meat and fish  Ask your healthcare provider if you need a special diet  · Take prenatal vitamins as directed  These help you get the right amount of vitamins and minerals  They may also decrease the risk of certain birth defects      · Do not drink alcohol or use illegal drugs  These can increase your risk for a miscarriage or harm your baby  · Do not smoke  Nicotine and other chemicals in cigarettes and cigars can harm your baby and cause miscarriage or  labor  Ask your healthcare provider for information if you currently smoke and need help to quit  E-cigarettes or smokeless tobacco still contain nicotine  Do not use these products  · Decrease your risk for an infection  Always wash your hands before eating or preparing meals  Do not spend time with people who are sick  Ask your healthcare provider if you need immunizations such as the flu or hepatitis B vaccine  Immunizations may decrease your risk for infections that could cause a miscarriage  · Manage your medical conditions  Keep your blood pressure and blood sugars under control  Maintain a healthy weight during pregnancy  Follow up with your obstetrician as directed: You may need to see your obstetrician frequently for ultrasounds or blood tests  Write down your questions so you remember to ask them during your visits  ©  2600 [employer] Information is for End User's use only and may not be sold, redistributed or otherwise used for commercial purposes  All illustrations and images included in CareNotes® are the copyrighted property of [employer]  or Jordan Hodge  The above information is an  only  It is not intended as medical advice for individual conditions or treatments  Talk to your doctor, nurse or pharmacist before following any medical regimen to see if it is safe and effective for you

## 2018-04-24 NOTE — ED PROVIDER NOTES
History  Chief Complaint   Patient presents with    Vaginal Bleeding - Pregnant     Pt ambulatory on unit C/O vaginal bleeding and back pain since 2000 last night  She states she is 6 weeks pregnant  History provided by:  Patient  Vaginal Bleeding   Quality:  Bright red  Severity:  Moderate  Onset quality:  Gradual  Duration:  2 days  Timing:  Intermittent  Progression:  Waxing and waning  Chronicity:  New  Menstrual history: currently 6wk gest   Number of pads used:  1 pad ever 3 hours over past 1-2 days  Possible pregnancy: yes (pt currently 6 wk gest by LMP)    Relieved by:  None tried  Worsened by:  Nothing  Ineffective treatments:  None tried  Associated symptoms: no abdominal pain, no dizziness, no dysuria, no fever and no nausea    Risk factors comment:  Previous ultrasound here about 2 weeks prior showing questionable ectopic pregnancy  Prior to Admission Medications   Prescriptions Last Dose Informant Patient Reported? Taking? Prenatal Vit-Fe Fumarate-FA (PRENATAL 1+1 PO)   Yes Yes   Sig: Take by mouth      Facility-Administered Medications: None       Past Medical History:   Diagnosis Date    [facility]     PCOS (polycystic ovarian syndrome)        Past Surgical History:   Procedure Laterality Date    OOPHORECTOMY         History reviewed  No pertinent family history  I have reviewed and agree with the history as documented  Social History   Substance Use Topics    Smoking status: Former Smoker     Packs/day: 0 20    Smokeless tobacco: Never Used    Alcohol use No        Review of Systems   Constitutional: Negative for activity change, chills, diaphoresis and fever  HENT: Negative for congestion, sinus pressure and sore throat  Eyes: Negative for pain and visual disturbance  Respiratory: Negative for cough, chest tightness, shortness of breath, wheezing and stridor  Cardiovascular: Negative for chest pain and palpitations     Gastrointestinal: Negative for abdominal distention, abdominal pain, constipation, diarrhea, nausea and vomiting  Genitourinary: Positive for vaginal bleeding  Negative for dysuria and frequency  Musculoskeletal: Negative for neck pain and neck stiffness  Skin: Negative for rash  Neurological: Negative for dizziness, speech difficulty, light-headedness, numbness and headaches  Physical Exam  ED Triage Vitals [04/24/18 1103]   Temperature Pulse Respirations Blood Pressure SpO2   (!) 97 4 °F (36 3 °C) 77 18 133/62 98 %      Temp Source Heart Rate Source Patient Position - Orthostatic VS BP Location FiO2 (%)   Oral Monitor Lying Right arm --      Pain Score       7           Orthostatic Vital Signs  Vitals:    04/24/18 1103   BP: 133/62   Pulse: 77   Patient Position - Orthostatic VS: Lying       Physical Exam   Constitutional: She is oriented to person, place, and time  She appears well-developed  No distress  HENT:   Head: Normocephalic and atraumatic  Eyes: Pupils are equal, round, and reactive to light  Neck: Normal range of motion  Neck supple  No tracheal deviation present  Cardiovascular: Normal rate, regular rhythm, normal heart sounds and intact distal pulses  No murmur heard  Pulmonary/Chest: Effort normal and breath sounds normal  No stridor  No respiratory distress  Abdominal: Soft  She exhibits no distension  There is no tenderness  There is no rebound and no guarding  Musculoskeletal: Normal range of motion  Neurological: She is alert and oriented to person, place, and time  Skin: Skin is warm and dry  She is not diaphoretic  No erythema  No pallor  Psychiatric: She has a normal mood and affect  Vitals reviewed        ED Medications  Medications - No data to display    Diagnostic Studies  Results Reviewed     Procedure Component Value Units Date/Time    Quantitative hCG [[id number]]  (Abnormal) Collected:  04/24/18 1140    Lab Status:  Final result Specimen:  Blood from Hand, Right Updated:  04/24/18 1231 HCG, Quant 55,294 (H) mIU/mL     Narrative:          Expected Ranges:     Approximate               Approximate HCG  Gestation age          Concentration ( mIU/mL)  _____________          ______________________   Cleo Mcfarlandmers                      HCG values  0 2-1                       5-50  1-2                           2-3                         100-5000  3-4                         500-05581  4-5                         1000-86594  5-6                         [id number]  6-8                         [id number]  8-12                        [id number]    CBC and differential [[id number]]  (Abnormal) Collected:  04/24/18 1140    Lab Status:  Final result Specimen:  Blood from Hand, Right Updated:  04/24/18 1146     WBC 9 98 Thousand/uL      RBC 4 83 Million/uL      Hemoglobin 12 2 g/dL      Hematocrit 37 7 %      MCV 78 (L) fL      MCH 25 3 (L) pg      MCHC 32 4 g/dL      RDW 13 9 %      MPV 9 9 fL      Platelets 628 Thousands/uL      Neutrophils Relative 70 %      Lymphocytes Relative 19 %      Monocytes Relative 6 %      Eosinophils Relative 4 %      Basophils Relative 1 %      Neutrophils Absolute 7 07 Thousands/µL      Lymphocytes Absolute 1 87 Thousands/µL      Monocytes Absolute 0 63 Thousand/µL      Eosinophils Absolute 0 35 Thousand/µL      Basophils Absolute 0 06 Thousands/µL                  US OB < 14 weeks with transvaginal   Final Result by Marley Inman MD (04/24 1224)      Single live intrauterine gestation at 7 weeks 0 days (range +/- 4 days)  JAIRO of December 11, 2018         Trace pelvic free fluid  Stable complex appearing left ovarian cyst with areas of hyper echogenicity which may reflect a dermoid cyst   Continue surveillance is recommended to assess for stability        Workstation performed: [id number]                    Procedures  Procedures       Phone Contacts  ED Phone Contact    ED Course  ED Course                                MDM  Number of Diagnoses or Management Options  Threatened miscarriage in early pregnancy: new and requires workup  Vaginal bleeding affecting early pregnancy: new and requires workup  Diagnosis management comments:       Initial ED assessment:  72-year-old female presents with vaginal bleeding, 6 weeks gestation, previous ultrasound questionable ectopic pregnancy    Initial DDx includes but is not limited to:   Ectopic pregnancy, normal pregnancy with bleeding, miscarriage    Initial ED plan:   Blood work, ultrasound, disposition pending ED workup  Blood type A+, no need for rhogam          Final ED summary/disposition:   After evaluation and workup in the emergency department, patient found to have a live intrauterine gestation  , patient will require follow-up with her OBGYN  Will diagnosis a threatened miscarriage           Amount and/or Complexity of Data Reviewed  Clinical lab tests: ordered and reviewed  Tests in the radiology section of CPT®: ordered and reviewed  Decide to obtain previous medical records or to obtain history from someone other than the patient: yes  Obtain history from someone other than the patient: yes  Review and summarize past medical records: yes    Patient Progress  Patient progress: improved    CritCare Time    Disposition  Final diagnoses:   Threatened miscarriage in early pregnancy   Vaginal bleeding affecting early pregnancy     Time reflects when diagnosis was documented in both MDM as applicable and the Disposition within this note     Time User Action Codes Description Comment    4/24/2018 12:36 PM Cm Loomis [O20 0] Threatened miscarriage in early pregnancy     4/24/2018 12:37  [facility] [O20 8] Vaginal bleeding affecting early pregnancy       ED Disposition     ED Disposition Condition Comment    Discharge  Tompa U  66  discharge to home/self care      Condition at discharge: Good        Follow-up Information     Follow up With Specialties Details Why [address] [facility]  [facility] and Gynecology Call today For repeat evaluation and  to ensure resolution, To arrange for the next available appointment [address] [phone]        Patient's Medications   Discharge Prescriptions    No medications on file     No discharge procedures on file      ED Provider  Electronically Signed by           Lisa Burger DO  04/24/18 5008

## 2018-06-04 ENCOUNTER — HOSPITAL ENCOUNTER (EMERGENCY)
Facility: HOSPITAL | Age: 28
Discharge: HOME | End: 2018-06-04
Attending: EMERGENCY MEDICINE | Admitting: EMERGENCY MEDICINE

## 2018-06-04 VITALS
DIASTOLIC BLOOD PRESSURE: 77 MMHG | BODY MASS INDEX: 53.4 KG/M2 | SYSTOLIC BLOOD PRESSURE: 145 MMHG | OXYGEN SATURATION: 96 % | WEIGHT: 293 LBS | TEMPERATURE: 98.1 F | HEART RATE: 100 BPM | RESPIRATION RATE: 16 BRPM

## 2018-06-04 DIAGNOSIS — R10.2 VAGINAL PAIN: ICD-10-CM

## 2018-06-04 DIAGNOSIS — O23.41 UTI IN PREGNANCY, FIRST TRIMESTER: Primary | ICD-10-CM

## 2018-06-04 LAB
BACTERIA UR QL AUTO: ABNORMAL /HPF
BILIRUB UR QL STRIP: NEGATIVE
CLARITY UR: ABNORMAL
COLOR UR: YELLOW
EXT PREG TEST URINE: POSITIVE
GLUCOSE UR STRIP-MCNC: NEGATIVE MG/DL
HGB UR QL STRIP.AUTO: ABNORMAL
KETONES UR STRIP-MCNC: NEGATIVE MG/DL
LEUKOCYTE ESTERASE UR QL STRIP: ABNORMAL
NITRITE UR QL STRIP: NEGATIVE
NON-SQ EPI CELLS URNS QL MICRO: ABNORMAL /HPF
PH UR STRIP.AUTO: 6 [PH] (ref 4.5–8)
PROT UR STRIP-MCNC: ABNORMAL MG/DL
RBC #/AREA URNS AUTO: ABNORMAL /HPF
SP GR UR STRIP.AUTO: 1.02 (ref 1–1.03)
UROBILINOGEN UR QL STRIP.AUTO: 1 E.U./DL
WBC #/AREA URNS AUTO: ABNORMAL /HPF

## 2018-06-04 PROCEDURE — 99283 EMERGENCY DEPT VISIT LOW MDM: CPT

## 2018-06-04 PROCEDURE — 81001 URINALYSIS AUTO W/SCOPE: CPT

## 2018-06-04 PROCEDURE — 81025 URINE PREGNANCY TEST: CPT | Performed by: EMERGENCY MEDICINE

## 2018-06-04 PROCEDURE — 87591 N.GONORRHOEAE DNA AMP PROB: CPT | Performed by: EMERGENCY MEDICINE

## 2018-06-04 PROCEDURE — 87491 CHLMYD TRACH DNA AMP PROBE: CPT | Performed by: EMERGENCY MEDICINE

## 2018-06-04 PROCEDURE — 87086 URINE CULTURE/COLONY COUNT: CPT

## 2018-06-04 RX ORDER — ALBUTEROL SULFATE 90 UG/1
2 AEROSOL, METERED RESPIRATORY (INHALATION) EVERY 4 HOURS
COMMUNITY
Start: 2017-12-18 | End: 2018-12-02

## 2018-06-04 RX ORDER — CEPHALEXIN 500 MG/1
500 CAPSULE ORAL 4 TIMES DAILY
Qty: 28 CAPSULE | Refills: 0 | Status: SHIPPED | OUTPATIENT
Start: 2018-06-04 | End: 2018-06-11

## 2018-06-04 RX ORDER — CEPHALEXIN 250 MG/1
500 CAPSULE ORAL ONCE
Status: COMPLETED | OUTPATIENT
Start: 2018-06-04 | End: 2018-06-04

## 2018-06-04 RX ADMIN — CEPHALEXIN 500 MG: 250 CAPSULE ORAL at 02:51

## 2018-06-04 NOTE — ED PROVIDER NOTES
History  Chief Complaint   Patient presents with    Vaginal Pain     Pt presents to ED from home w/ vaginal pain starting Saturday morning constant  Pt 12 weeks pregnant  Pt (-) blood or discharge from vagina  Pt (+) hx a fib and PCOS  Patient is a 29year old female with vaginal pain for past 2 days  No N/V  No urinary sx  No fever  No vaginal bleeding  Patient states she is 12 weeks pregnant  Was last seen in this ED on 4/24/18 for threatened miscarriage  [facility] website checked on this patient and last Rx filled was on 9/5/17 for tramadol for 5 day supply  History provided by:  Patient (boyfriend)   used: No        Prior to Admission Medications   Prescriptions Last Dose Informant Patient Reported? Taking? Prenatal Vit-Fe Fumarate-FA (PRENATAL 1+1 PO) 6/3/2018 at Unknown time  Yes Yes   Sig: Take by mouth   albuterol (PROVENTIL HFA,VENTOLIN HFA) 90 mcg/act inhaler More than a month at Unknown time  Yes No   Sig: Inhale 2 puffs every 4 (four) hours      Facility-Administered Medications: None       Past Medical History:   Diagnosis Date    A-fib ([facility] [address] )     PCOS (polycystic ovarian syndrome)        Past Surgical History:   Procedure Laterality Date    OOPHORECTOMY         History reviewed  No pertinent family history  I have reviewed and agree with the history as documented  Social History   Substance Use Topics    Smoking status: Former Smoker     Packs/day: 0 20     Types: Cigarettes    Smokeless tobacco: Never Used    Alcohol use No        Review of Systems   Constitutional: Negative for fever  Gastrointestinal: Negative for abdominal pain, nausea and vomiting  Genitourinary: Positive for vaginal pain  Negative for difficulty urinating and vaginal bleeding  Physical Exam  Physical Exam   Constitutional: She appears well-developed and well-nourished  No distress  Pulmonary/Chest: Effort normal  No respiratory distress  Abdominal: Soft   Bowel sounds are normal  There is no tenderness  Genitourinary: Vagina normal  No vaginal discharge found  Genitourinary Comments: Female ED RN Meghana Solano present during pelvic exam  No CMT or adnexal masses or tenderness or bleeding from closed cervical os  Neurological: She is alert  Skin: Skin is warm and dry  No rash noted  Nursing note and vitals reviewed  Vital Signs  ED Triage Vitals [06/04/18 0047]   Temperature Pulse Respirations Blood Pressure SpO2   98 1 °F (36 7 °C) 95 16 144/65 99 %      Temp Source Heart Rate Source Patient Position - Orthostatic VS BP Location FiO2 (%)   Oral Monitor Lying Right arm --      Pain Score       7           Vitals:    06/04/18 0047   BP: 144/65   Pulse: 95   Patient Position - Orthostatic VS: Lying       Visual Acuity      ED Medications  Medications   cephalexin (KEFLEX) capsule 500 mg (not administered)       Diagnostic Studies  Results Reviewed     Procedure Component Value Units Date/Time    Urine Microscopic [[id number]]  (Abnormal) Collected:  06/04/18 0147    Lab Status:  Final result Specimen:  Urine from Urine, Clean Catch Updated:  06/04/18 0211     RBC, UA 1-2 (A) /hpf      WBC, UA 10-20 (A) /hpf      Epithelial Cells Occasional /hpf      Bacteria, UA Occasional /hpf     Urine culture [[id number]] Collected:  06/04/18 0147    Lab Status: In process Specimen:  Urine from Urine, Clean Catch Updated:  06/04/18 0211    Chlamydia/GC amplified DNA by PCR [[id number]] Collected:  06/04/18 0145    Lab Status:   In process Specimen:  Urine from Urine, Other Updated:  06/04/18 0148    POCT pregnancy, urine [[id number]]  (Normal) Resulted:  06/04/18 0148    Lab Status:  Final result Updated:  06/04/18 0148     EXT PREG TEST UR (Ref: Negative) positive    ED Urine Macroscopic [[id number]]  (Abnormal) Collected:  06/04/18 0147    Lab Status:  Final result Specimen:  Urine Updated:  06/04/18 0143     Color, UA Yellow     Clarity, UA Slightly Cloudy     pH, UA 6 0     Leukocytes, UA Small (A)     Nitrite, UA Negative     Protein, UA Trace (A) mg/dl      Glucose, UA Negative mg/dl      Ketones, UA Negative mg/dl      Urobilinogen, UA 1 0 E U /dl      Bilirubin, UA Negative     Blood, UA Small (A)     Specific [place], UA 1 025    Narrative:       CLINITEK RESULT                 No orders to display              Procedures  Procedures       Phone Contacts  ED Phone Contact    ED Course                               MDM  Number of Diagnoses or Management Options  Diagnosis management comments: Differential diagnosis including but not limited to: vaginitis, cervicitis, PID, UTI, abscess, threatened miscarriage, TOA  Amount and/or Complexity of Data Reviewed  Clinical lab tests: ordered and reviewed  Decide to obtain previous medical records or to obtain history from someone other than the patient: yes  Review and summarize past medical records: yes      CritCare Time    Disposition  Final diagnoses:   Vaginal pain   UTI in pregnancy, first trimester     Time reflects when diagnosis was documented in both MDM as applicable and the Disposition within this note     Time User Action Codes Description Comment    6/4/2018  2:45 AM Bob Ruddle Add [N39 0] UTI (urinary tract infection)     6/4/2018  2:45 AM Bob Ruddle Add [R10 2] Vaginal pain     6/4/2018  2:45 AM Bob Ruddle Modify [R10 2] Vaginal pain     6/4/2018  2:45 AM [place] Ruddle Remove [N39 0] UTI (urinary tract infection)     6/4/2018  2:45 AM [place] Ruddle Add [O23 41] UTI in pregnancy, first trimester     6/4/2018  2:45 AM [place] Ruddle Modify [R10 2] Vaginal pain     6/4/2018  2:45 AM Bob Ruddle Modify [O23 41] UTI in pregnancy, first trimester       ED Disposition     ED Disposition Condition Comment    Discharge  Tompa U  66  discharge to home/self care      Condition at discharge: Stable        Follow-up Information     Follow up With Specialties Details Why Contact Info    own obstetrician  Call in 1 day tylenol for pain  Return sooner if increased pain, fever, vomiting, difficulty urinating, bleeding  Patient's Medications   Discharge Prescriptions    CEPHALEXIN (KEFLEX) 500 MG CAPSULE    Take 1 capsule (500 mg total) by mouth 4 (four) times a day for 7 days       Start Date: 6/4/2018  End Date: 6/11/2018       Order Dose: 500 mg       Quantity: 28 capsule    Refills: 0     No discharge procedures on file      ED Provider  Electronically Signed by           Reina Zaman MD  06/04/18 0354

## 2018-06-04 NOTE — DISCHARGE INSTRUCTIONS
Urinary Tract Infection in Pregnancy   WHAT YOU NEED TO KNOW:   A urinary tract infection (UTI) is caused by bacteria that get inside your urinary tract  The urinary tract includes your kidneys and bladder  UTIs are common in women, especially during pregnancy  This is because of changes in your immune system, hormones, and uterus  As your uterus grows, your bladder may not completely empty  Bacteria can grow in the urine left in your bladder and cause a UTI  UTIs during pregnancy can increase your risk for a kidney infection and  labor  DISCHARGE INSTRUCTIONS:   Return to the emergency department if:   · You are urinating very little or not at all  · You have severe pain  · You have a fever and chills  Contact your healthcare provider if:   · You have pain in the sides of your back  · You do not feel better after 2 days of treatment  · You are vomiting  · You have questions or concerns about your condition or care  Medicines:   · Antibiotics  help fight a bacterial infection  · Medicines  may be given to decrease pain and burning when you urinate  They will also help decrease the feeling that you need to urinate often  These medicines will make your urine orange or red  · Take your medicine as directed  Contact your healthcare provider if you think your medicine is not helping or if you have side effects  Tell him or her if you are allergic to any medicine  Keep a list of the medicines, vitamins, and herbs you take  Include the amounts, and when and why you take them  Bring the list or the pill bottles to follow-up visits  Carry your medicine list with you in case of an emergency  Prevent another UTI:   · Urinate when you feel the urge  Do not hold your urine  Urinate as soon as needed  Always urinate after you have sex  This helps flush out bacteria passed during sex  · Drink liquids as directed  Ask how much liquid to drink each day and which liquids are best for you  You may need to drink more fluids than usual to help flush bacteria out of your urinary tract  Do not drink caffeine or carbonated liquids  These drinks can irritate your bladder  Your healthcare provider may recommend cranberry juice to help prevent a UTI  · Wipe from front to back after you urinate or have a bowel movement  This will help prevent germs from getting into your urinary tract through your urethra  · Do pelvic muscle exercises often  Pelvic muscle exercises may help you start and stop urinating  Strong pelvic muscles may help you empty your bladder easier  Squeeze these muscles tightly for 5 seconds like you are trying to hold back urine  Then relax for 5 seconds  Gradually work up to squeezing for 10 seconds  Do 3 sets of 15 repetitions a day, or as directed  Follow up with your healthcare provider as directed: You may need to return for more urine tests  Write down your questions so you remember to ask them during your visits  © 2017 [employer] Information is for End User's use only and may not be sold, redistributed or otherwise used for commercial purposes  All illustrations and images included in CareNotes® are the copyrighted property of [employer]  or [employer]  The above information is an  only  It is not intended as medical advice for individual conditions or treatments  Talk to your doctor, nurse or pharmacist before following any medical regimen to see if it is safe and effective for you

## 2018-06-05 LAB — BACTERIA UR CULT: NORMAL

## 2018-06-06 LAB
CHLAMYDIA DNA CVX QL NAA+PROBE: NORMAL
N GONORRHOEA DNA GENITAL QL NAA+PROBE: NORMAL

## 2018-06-26 ENCOUNTER — HOSPITAL ENCOUNTER (EMERGENCY)
Facility: HOSPITAL | Age: 28
Discharge: HOME | End: 2018-06-26
Attending: EMERGENCY MEDICINE | Admitting: EMERGENCY MEDICINE

## 2018-06-26 VITALS
WEIGHT: 293 LBS | BODY MASS INDEX: 52.91 KG/M2 | TEMPERATURE: 96.8 F | RESPIRATION RATE: 25 BRPM | SYSTOLIC BLOOD PRESSURE: 145 MMHG | HEART RATE: 86 BPM | DIASTOLIC BLOOD PRESSURE: 86 MMHG | OXYGEN SATURATION: 96 %

## 2018-06-26 DIAGNOSIS — R07.9 CHEST PAIN, UNSPECIFIED TYPE: Primary | ICD-10-CM

## 2018-06-26 LAB
ALBUMIN SERPL BCP-MCNC: 3 G/DL (ref 3.5–5)
ALP SERPL-CCNC: 100 U/L (ref 46–116)
ALT SERPL W P-5'-P-CCNC: 25 U/L (ref 12–78)
ANION GAP SERPL CALCULATED.3IONS-SCNC: 9 MMOL/L (ref 4–13)
APTT PPP: 28 SECONDS (ref 24–36)
AST SERPL W P-5'-P-CCNC: 17 U/L (ref 5–45)
BASOPHILS # BLD AUTO: 0.03 THOUSANDS/ΜL (ref 0–0.1)
BASOPHILS NFR BLD AUTO: 0 % (ref 0–1)
BILIRUB SERPL-MCNC: 0.2 MG/DL (ref 0.2–1)
BUN SERPL-MCNC: 6 MG/DL (ref 5–25)
CALCIUM SERPL-MCNC: 9.1 MG/DL (ref 8.3–10.1)
CHLORIDE SERPL-SCNC: 103 MMOL/L (ref 100–108)
CO2 SERPL-SCNC: 25 MMOL/L (ref 21–32)
CREAT SERPL-MCNC: 0.61 MG/DL (ref 0.6–1.3)
EOSINOPHIL # BLD AUTO: 0.11 THOUSAND/ΜL (ref 0–0.61)
EOSINOPHIL NFR BLD AUTO: 1 % (ref 0–6)
ERYTHROCYTE [DISTWIDTH] IN BLOOD BY AUTOMATED COUNT: 13.5 % (ref 11.6–15.1)
GFR SERPL CREATININE-BSD FRML MDRD: 124 ML/MIN/1.73SQ M
GLUCOSE SERPL-MCNC: 100 MG/DL (ref 65–140)
HCT VFR BLD AUTO: 35 % (ref 34.8–46.1)
HGB BLD-MCNC: 11 G/DL (ref 11.5–15.4)
IMM GRANULOCYTES # BLD AUTO: 0.04 THOUSAND/UL (ref 0–0.2)
IMM GRANULOCYTES NFR BLD AUTO: 0 % (ref 0–2)
INR PPP: 0.98 (ref 0.86–1.16)
LYMPHOCYTES # BLD AUTO: 1.4 THOUSANDS/ΜL (ref 0.6–4.47)
LYMPHOCYTES NFR BLD AUTO: 15 % (ref 14–44)
MCH RBC QN AUTO: 25.3 PG (ref 26.8–34.3)
MCHC RBC AUTO-ENTMCNC: 31.4 G/DL (ref 31.4–37.4)
MCV RBC AUTO: 81 FL (ref 82–98)
MONOCYTES # BLD AUTO: 0.86 THOUSAND/ΜL (ref 0.17–1.22)
MONOCYTES NFR BLD AUTO: 9 % (ref 4–12)
NEUTROPHILS # BLD AUTO: 6.78 THOUSANDS/ΜL (ref 1.85–7.62)
NEUTS SEG NFR BLD AUTO: 75 % (ref 43–75)
NRBC BLD AUTO-RTO: 0 /100 WBCS
PLATELET # BLD AUTO: 293 THOUSANDS/UL (ref 149–390)
PMV BLD AUTO: 9.8 FL (ref 8.9–12.7)
POTASSIUM SERPL-SCNC: 3.7 MMOL/L (ref 3.5–5.3)
PROT SERPL-MCNC: 6.8 G/DL (ref 6.4–8.2)
PROTHROMBIN TIME: 10.3 SECONDS (ref 9.4–11.7)
RBC # BLD AUTO: 4.34 MILLION/UL (ref 3.81–5.12)
SODIUM SERPL-SCNC: 137 MMOL/L (ref 136–145)
TROPONIN I SERPL-MCNC: <0.02 NG/ML
WBC # BLD AUTO: 9.22 THOUSAND/UL (ref 4.31–10.16)

## 2018-06-26 PROCEDURE — 85025 COMPLETE CBC W/AUTO DIFF WBC: CPT | Performed by: EMERGENCY MEDICINE

## 2018-06-26 PROCEDURE — 36415 COLL VENOUS BLD VENIPUNCTURE: CPT

## 2018-06-26 PROCEDURE — 80053 COMPREHEN METABOLIC PANEL: CPT | Performed by: EMERGENCY MEDICINE

## 2018-06-26 PROCEDURE — 85610 PROTHROMBIN TIME: CPT | Performed by: EMERGENCY MEDICINE

## 2018-06-26 PROCEDURE — 99285 EMERGENCY DEPT VISIT HI MDM: CPT

## 2018-06-26 PROCEDURE — 93005 ELECTROCARDIOGRAM TRACING: CPT

## 2018-06-26 PROCEDURE — 84484 ASSAY OF TROPONIN QUANT: CPT | Performed by: EMERGENCY MEDICINE

## 2018-06-26 PROCEDURE — 85730 THROMBOPLASTIN TIME PARTIAL: CPT | Performed by: EMERGENCY MEDICINE

## 2018-06-26 RX ORDER — ASPIRIN 81 MG/1
81 TABLET, CHEWABLE ORAL DAILY
COMMUNITY
End: 2018-12-02

## 2018-06-27 NOTE — DISCHARGE INSTRUCTIONS
Chest Pain   WHAT YOU NEED TO KNOW:   What causes chest pain? Chest pain can be caused by a range of conditions, from not serious to life-threatening  Chest pain can be a symptom of a digestive problem, such as acid reflux or a stomach ulcer  An anxiety attack or a strong emotion, such as anger, can also cause chest pain  Infection, inflammation, or a fracture in the bones or cartilage in your chest can cause pain or discomfort  Sometimes chest pain or pressure is caused by poor blood flow to your heart (angina)  Chest pain may also be caused by life-threatening conditions such as a heart attack or blood clot in your lungs  What other symptoms might I have with chest pain? · A burning feeling behind your breastbone    · A racing or slow heartbeat     · Fever or sweating     · Nausea or vomiting     · Shortness of breath     · Discomfort or pressure that spreads from your chest to your back, jaw, or arm     · Feeling weak, tired, or faint  How is the cause of chest pain diagnosed? Your healthcare provider will examine you  Describe your chest pain in as much detail as possible  Tell him or her where your pain is and when it began  Tell the provider if you notice anything that makes the pain worse or better  Tell him or her if it is constant or comes and goes  Your healthcare provider will ask about any medicines you use and medical conditions you have  He or she will also examine you  You may also need any of the following tests:  · An EKG  is a test that records your heart's electrical activity  · Blood tests  check for heart damage and signs of a heart attack  · An echocardiogram  uses sound waves to see if blood is flowing normally through your heart  · An ultrasound, x-ray, CT, or MRI scan  may show the cause of your chest pain  You may be given contrast liquid to help your heart show up better in the pictures   Tell the healthcare provider if you have ever had an allergic reaction to contrast liquid  Do not enter the MRI room with anything metal  Metal can cause serious injury  Tell the healthcare provider if you have any metal in or on your body  · An endoscopy  may be done to check for ulcers or problem with your esophagus  How is chest pain treated? Medicines may be given to treat the cause of your chest pain  Examples include pain medicine, anxiety medicine, or medicines to increase blood flow to your heart  Do not  take certain medicines without asking your healthcare provider first  These include NSAIDs, herbal or vitamin supplements, or hormones (estrogen or progestin)  What are some healthy living tips? The following are general healthy guidelines  If your chest pain is caused by a heart problem, your healthcare provider will give you specific guidelines to follow  · Do not smoke  Nicotine and other chemicals in cigarettes and cigars can cause lung and heart damage  Ask your healthcare provider for information if you currently smoke and need help to quit  E-cigarettes or smokeless tobacco still contain nicotine  Talk to your healthcare provider before you use these products  · Eat a variety of healthy, low-fat foods  Healthy foods include fruits, vegetables, whole-grain breads, low-fat dairy products, beans, lean meats, and fish  Ask for more information about a heart healthy diet  · Ask about activity  Your healthcare provider will tell you which activities to limit or avoid  Ask when you can drive, return to work, and have sex  Ask about the best exercise plan for you  · Maintain a healthy weight  Ask your healthcare provider how much you should weigh  Ask him or her to help you create a weight loss plan if you are overweight    Call 911 if:   · You have any of the following signs of a heart attack:      ¨ Squeezing, pressure, or pain in your chest that lasts longer than 5 minutes or returns    ¨ Discomfort or pain in your back, neck, jaw, stomach, or arm     ¨ Trouble breathing    ¨ Nausea or vomiting    ¨ Lightheadedness or a sudden cold sweat, especially with chest pain or trouble breathing    When should I seek immediate care? · You have chest discomfort that gets worse, even with medicine  · You cough or vomit blood  · Your bowel movements are black or bloody  · You cannot stop vomiting, or it hurts to swallow  When should I contact my healthcare provider? · You have questions or concerns about your condition or care  CARE AGREEMENT:   You have the right to help plan your care  Learn about your health condition and how it may be treated  Discuss treatment options with your caregivers to decide what care you want to receive  You always have the right to refuse treatment  The above information is an  only  It is not intended as medical advice for individual conditions or treatments  Talk to your doctor, nurse or pharmacist before following any medical regimen to see if it is safe and effective for you  © 2017 [employer] Information is for End User's use only and may not be sold, redistributed or otherwise used for commercial purposes  All illustrations and images included in CareNotes® are the copyrighted property of [employer]  or [employer]

## 2018-06-27 NOTE — ED PROVIDER NOTES
History  Chief Complaint   Patient presents with    Chest Pain     Pt states pain under left breast which radiates to her left shoulder, pt also complains of shortness of breath x 3days  15-year-old female with complaints of some chest discomfort and slight shortness of breath over the last 3 days  Patient states she is 16 weeks pregnant and has started with this discomfort over the last day to 2 days  No fevers no chills no nausea vomiting or diarrhea no swelling in her legs  No history of similar  History provided by:  Patient   used: No        Prior to Admission Medications   Prescriptions Last Dose Informant Patient Reported? Taking? Prenatal Vit-Fe Fumarate-FA (PRENATAL 1+1 PO)   Yes Yes   Sig: Take by mouth   albuterol (PROVENTIL HFA,VENTOLIN HFA) 90 mcg/act inhaler   Yes Yes   Sig: Inhale 2 puffs every 4 (four) hours   aspirin 81 mg chewable tablet   Yes Yes   Sig: Chew 81 mg daily      Facility-Administered Medications: None       Past Medical History:   Diagnosis Date    A[facility]fib ([facility] )     PCOS (polycystic ovarian syndrome)        Past Surgical History:   Procedure Laterality Date    OOPHORECTOMY         History reviewed  No pertinent family history  I have reviewed and agree with the history as documented  Social History   Substance Use Topics    Smoking status: Former Smoker     Packs/day: 0 20     Types: Cigarettes    Smokeless tobacco: Never Used    Alcohol use No        Review of Systems   Constitutional: Negative for activity change, chills, diaphoresis and fever  HENT: Negative for congestion, ear pain, nosebleeds, sore throat, trouble swallowing and voice change  Eyes: Negative for pain, discharge and redness  Respiratory: Negative for apnea, cough, choking, shortness of breath, wheezing and stridor  Cardiovascular: Positive for chest pain  Negative for palpitations     Gastrointestinal: Negative for abdominal distention, abdominal pain, constipation, diarrhea, nausea and vomiting  Endocrine: Negative for polydipsia  Genitourinary: Negative for difficulty urinating, dysuria, flank pain, frequency, hematuria and urgency  Musculoskeletal: Negative for back pain, gait problem, joint swelling, myalgias, neck pain and neck stiffness  Skin: Negative for pallor and rash  Neurological: Negative for dizziness, tremors, syncope, speech difficulty, weakness, numbness and headaches  Hematological: Negative for adenopathy  Psychiatric/Behavioral: Negative for confusion, hallucinations, self-injury and suicidal ideas  The patient is not nervous/anxious  Physical Exam  Physical Exam   Constitutional: She is oriented to person, place, and time  Vital signs are normal  She appears well-developed and well-nourished  HENT:   Head: Normocephalic and atraumatic  Right Ear: External ear normal    Left Ear: External ear normal    Nose: Nose normal    Mouth/Throat: Oropharynx is clear and moist    Eyes: Conjunctivae and EOM are normal  Pupils are equal, round, and reactive to light  Neck: Normal range of motion  Neck supple  Cardiovascular: Normal rate, regular rhythm, normal heart sounds and intact distal pulses  Pulmonary/Chest: Effort normal and breath sounds normal    Abdominal: Soft  Bowel sounds are normal    Musculoskeletal: Normal range of motion  Neurological: She is alert and oriented to person, place, and time  Skin: Skin is warm  Psychiatric: She has a normal mood and affect  Nursing note and vitals reviewed        Vital Signs  ED Triage Vitals   Temperature Pulse Respirations Blood Pressure SpO2   06/26/18 2015 06/26/18 2007 06/26/18 2007 06/26/18 2007 06/26/18 2007   (!) 96 8 °F (36 °C) 73 20 145/86 96 %      Temp src Heart Rate Source Patient Position - Orthostatic VS BP Location FiO2 (%)   -- 06/26/18 2007 06/26/18 2007 06/26/18 2007 --    Monitor Sitting Left arm       Pain Score       06/26/18 2007 8 Vitals:    06/26/18 2007 06/26/18 2045 06/26/18 2115 06/26/18 2145   BP: 145/86      Pulse: 73 80 78 86   Patient Position - Orthostatic VS: Sitting          Visual Acuity      ED Medications  Medications - No data to display    Diagnostic Studies  Results Reviewed     Procedure Component Value Units Date/Time    Troponin I [[id number]]  (Normal) Collected:  06/26/18 2014    Lab Status:  Final result Specimen:  Blood from Arm, Left Updated:  06/26/18 2041     Troponin I <0 02 ng/mL     Comprehensive metabolic panel [[id number]]  (Abnormal) Collected:  06/26/18 2014    Lab Status:  Final result Specimen:  Blood from Arm, Left Updated:  06/26/18 2036     Sodium 137 mmol/L      Potassium 3 7 mmol/L      Chloride 103 mmol/L      CO2 25 mmol/L      Anion Gap 9 mmol/L      BUN 6 mg/dL      Creatinine 0 61 mg/dL      Glucose 100 mg/dL      Calcium 9 1 mg/dL      AST 17 U/L      ALT 25 U/L      Alkaline Phosphatase 100 U/L      Total Protein 6 8 g/dL      Albumin 3 0 (L) g/dL      Total Bilirubin 0 20 mg/dL      eGFR 124 ml/min/1 73sq m     Narrative:         National Kidney Disease Education Program recommendations are as follows:  GFR calculation is accurate only with a steady state creatinine  Chronic Kidney disease less than 60 ml/min/1 73 sq  meters  Kidney failure less than 15 ml/min/1 73 sq  meters      Protime-INR [[id number]]  (Normal) Collected:  06/26/18 2014    Lab Status:  Final result Specimen:  Blood from Arm, Left Updated:  06/26/18 2034     Protime 10 3 seconds      INR 0 98    APTT [[id number]]  (Normal) Collected:  06/26/18 2014    Lab Status:  Final result Specimen:  Blood from Arm, Left Updated:  06/26/18 2034     PTT 28 seconds     CBC and differential [[id number]]  (Abnormal) Collected:  06/26/18 2014    Lab Status:  Final result Specimen:  Blood from Arm, Left Updated:  06/26/18 2019     WBC 9 22 Thousand/uL      RBC 4 34 Million/uL      Hemoglobin 11 0 (L) g/dL      Hematocrit 35 0 %      MCV 81 (L) fL MCH 25 3 (L) pg      MCHC 31 4 g/dL      RDW 13 5 %      MPV 9 8 fL      Platelets 600 Thousands/uL      nRBC 0 /100 WBCs      Neutrophils Relative 75 %      Immat GRANS % 0 %      Lymphocytes Relative 15 %      Monocytes Relative 9 %      Eosinophils Relative 1 %      Basophils Relative 0 %      Neutrophils Absolute 6 78 Thousands/µL      Immature Grans Absolute 0 04 Thousand/uL      Lymphocytes Absolute 1 40 Thousands/µL      Monocytes Absolute 0 86 Thousand/µL      Eosinophils Absolute 0 11 Thousand/µL      Basophils Absolute 0 03 Thousands/µL                  No orders to display              Procedures  Procedures       Phone Contacts  ED Phone Contact    ED Course                               MDM  CritCare Time    Disposition  Final diagnoses:   Chest pain, unspecified type     Time reflects when diagnosis was documented in both MDM as applicable and the Disposition within this note     Time User Action Codes Description Comment    6/26/2018  9:56 PM Cliff Loomis [R07 9] Chest pain, unspecified type       ED Disposition     ED Disposition Condition Comment    Discharge  Tompa U  66  discharge to home/self care  Condition at discharge: Stable        Follow-up Information     Follow up With Specialties Details Why Contact Info    Ashlyn Raya DO Family Medicine Schedule an appointment as soon as possible for a visit  [place] [place]  [address] [place] [address]  [phone]            Discharge Medication List as of 6/26/2018  9:56 PM      CONTINUE these medications which have NOT CHANGED    Details   albuterol (PROVENTIL HFA,VENTOLIN HFA) 90 mcg/act inhaler Inhale 2 puffs every 4 (four) hours, Starting Mon 12/18/2017, Historical Med      aspirin 81 mg chewable tablet Chew 81 mg daily, Historical Med      Prenatal Vit-Fe Fumarate-FA (PRENATAL 1+1 PO) Take by mouth, Historical Med           No discharge procedures on file      ED Provider  Electronically Signed by           Jill Beck DO  06/26/18 9833

## 2018-06-29 LAB
ATRIAL RATE: 79 BPM
P AXIS: 8 DEGREES
PR INTERVAL: 120 MS
QRS AXIS: -2 DEGREES
QRSD INTERVAL: 108 MS
QT INTERVAL: 390 MS
QTC INTERVAL: 447 MS
T WAVE AXIS: 16 DEGREES
VENTRICULAR RATE: 79 BPM

## 2018-06-29 PROCEDURE — 93010 ELECTROCARDIOGRAM REPORT: CPT | Performed by: INTERNAL MEDICINE

## 2018-08-27 ENCOUNTER — HOSPITAL ENCOUNTER (OUTPATIENT)
Facility: HOSPITAL | Age: 28
Discharge: HOME | End: 2018-08-27
Attending: OBSTETRICS & GYNECOLOGY | Admitting: OBSTETRICS & GYNECOLOGY

## 2018-08-27 ENCOUNTER — HOSPITAL ENCOUNTER (EMERGENCY)
Facility: HOSPITAL | Age: 28
Discharge: HOME | End: 2018-08-27
Attending: EMERGENCY MEDICINE | Admitting: EMERGENCY MEDICINE

## 2018-08-27 VITALS
DIASTOLIC BLOOD PRESSURE: 81 MMHG | SYSTOLIC BLOOD PRESSURE: 141 MMHG | OXYGEN SATURATION: 99 % | HEART RATE: 80 BPM | BODY MASS INDEX: 55.48 KG/M2 | WEIGHT: 293 LBS | RESPIRATION RATE: 20 BRPM | TEMPERATURE: 97.5 F

## 2018-08-27 VITALS
RESPIRATION RATE: 20 BRPM | DIASTOLIC BLOOD PRESSURE: 76 MMHG | HEART RATE: 82 BPM | SYSTOLIC BLOOD PRESSURE: 139 MMHG | TEMPERATURE: 98.1 F

## 2018-08-27 DIAGNOSIS — R10.2 PELVIC CRAMPING: Primary | ICD-10-CM

## 2018-08-27 DIAGNOSIS — R51.9 ACUTE HEADACHE: ICD-10-CM

## 2018-08-27 DIAGNOSIS — Z3A.24 24 WEEKS GESTATION OF PREGNANCY: ICD-10-CM

## 2018-08-27 PROBLEM — G43.909 MIGRAINE: Status: ACTIVE | Noted: 2018-08-27

## 2018-08-27 LAB
ALBUMIN SERPL BCP-MCNC: 2.8 G/DL (ref 3.5–5)
ALP SERPL-CCNC: 132 U/L (ref 46–116)
ALT SERPL W P-5'-P-CCNC: 12 U/L (ref 12–78)
ANION GAP SERPL CALCULATED.3IONS-SCNC: 9 MMOL/L (ref 4–13)
AST SERPL W P-5'-P-CCNC: 10 U/L (ref 5–45)
BACTERIA UR QL AUTO: ABNORMAL /HPF
BASOPHILS # BLD AUTO: 0.04 THOUSANDS/ΜL (ref 0–0.1)
BASOPHILS NFR BLD AUTO: 0 % (ref 0–1)
BILIRUB SERPL-MCNC: 0.3 MG/DL (ref 0.2–1)
BILIRUB UR QL STRIP: NEGATIVE
BUN SERPL-MCNC: 5 MG/DL (ref 5–25)
CALCIUM SERPL-MCNC: 9.3 MG/DL (ref 8.3–10.1)
CHLORIDE SERPL-SCNC: 105 MMOL/L (ref 100–108)
CLARITY UR: ABNORMAL
CO2 SERPL-SCNC: 22 MMOL/L (ref 21–32)
COLOR UR: YELLOW
CREAT SERPL-MCNC: 0.47 MG/DL (ref 0.6–1.3)
CREAT UR-MCNC: 89.4 MG/DL
EOSINOPHIL # BLD AUTO: 0.14 THOUSAND/ΜL (ref 0–0.61)
EOSINOPHIL NFR BLD AUTO: 1 % (ref 0–6)
ERYTHROCYTE [DISTWIDTH] IN BLOOD BY AUTOMATED COUNT: 13.7 % (ref 11.6–15.1)
GFR SERPL CREATININE-BSD FRML MDRD: 135 ML/MIN/1.73SQ M
GLUCOSE SERPL-MCNC: 103 MG/DL (ref 65–140)
GLUCOSE UR STRIP-MCNC: NEGATIVE MG/DL
HCT VFR BLD AUTO: 32.8 % (ref 34.8–46.1)
HGB BLD-MCNC: 10.3 G/DL (ref 11.5–15.4)
HGB UR QL STRIP.AUTO: NEGATIVE
HYALINE CASTS #/AREA URNS LPF: ABNORMAL /LPF
IMM GRANULOCYTES # BLD AUTO: 0.04 THOUSAND/UL (ref 0–0.2)
IMM GRANULOCYTES NFR BLD AUTO: 0 % (ref 0–2)
KETONES UR STRIP-MCNC: NEGATIVE MG/DL
LEUKOCYTE ESTERASE UR QL STRIP: ABNORMAL
LYMPHOCYTES # BLD AUTO: 1.74 THOUSANDS/ΜL (ref 0.6–4.47)
LYMPHOCYTES NFR BLD AUTO: 16 % (ref 14–44)
MCH RBC QN AUTO: 25.2 PG (ref 26.8–34.3)
MCHC RBC AUTO-ENTMCNC: 31.4 G/DL (ref 31.4–37.4)
MCV RBC AUTO: 80 FL (ref 82–98)
MONOCYTES # BLD AUTO: 0.68 THOUSAND/ΜL (ref 0.17–1.22)
MONOCYTES NFR BLD AUTO: 6 % (ref 4–12)
NEUTROPHILS # BLD AUTO: 7.97 THOUSANDS/ΜL (ref 1.85–7.62)
NEUTS SEG NFR BLD AUTO: 77 % (ref 43–75)
NITRITE UR QL STRIP: NEGATIVE
NON-SQ EPI CELLS URNS QL MICRO: ABNORMAL /HPF
NRBC BLD AUTO-RTO: 0 /100 WBCS
PH UR STRIP.AUTO: 6 [PH] (ref 4.5–8)
PLATELET # BLD AUTO: 310 THOUSANDS/UL (ref 149–390)
PMV BLD AUTO: 10.3 FL (ref 8.9–12.7)
POTASSIUM SERPL-SCNC: 3.8 MMOL/L (ref 3.5–5.3)
PROT SERPL-MCNC: 6.9 G/DL (ref 6.4–8.2)
PROT UR STRIP-MCNC: ABNORMAL MG/DL
PROT UR-MCNC: 22 MG/DL
PROT/CREAT UR: 0.25 MG/G{CREAT} (ref 0–0.1)
RBC # BLD AUTO: 4.09 MILLION/UL (ref 3.81–5.12)
RBC #/AREA URNS AUTO: ABNORMAL /HPF
SODIUM SERPL-SCNC: 136 MMOL/L (ref 136–145)
SP GR UR STRIP.AUTO: 1.02 (ref 1–1.03)
UROBILINOGEN UR QL STRIP.AUTO: 0.2 E.U./DL
WBC # BLD AUTO: 10.61 THOUSAND/UL (ref 4.31–10.16)
WBC #/AREA URNS AUTO: ABNORMAL /HPF

## 2018-08-27 PROCEDURE — 99214 OFFICE O/P EST MOD 30 MIN: CPT

## 2018-08-27 PROCEDURE — 84156 ASSAY OF PROTEIN URINE: CPT | Performed by: OBSTETRICS & GYNECOLOGY

## 2018-08-27 PROCEDURE — 85025 COMPLETE CBC W/AUTO DIFF WBC: CPT | Performed by: OBSTETRICS & GYNECOLOGY

## 2018-08-27 PROCEDURE — 80053 COMPREHEN METABOLIC PANEL: CPT | Performed by: OBSTETRICS & GYNECOLOGY

## 2018-08-27 PROCEDURE — 76817 TRANSVAGINAL US OBSTETRIC: CPT | Performed by: OBSTETRICS & GYNECOLOGY

## 2018-08-27 PROCEDURE — 99284 EMERGENCY DEPT VISIT MOD MDM: CPT

## 2018-08-27 PROCEDURE — 82570 ASSAY OF URINE CREATININE: CPT | Performed by: OBSTETRICS & GYNECOLOGY

## 2018-08-27 PROCEDURE — 87086 URINE CULTURE/COLONY COUNT: CPT | Performed by: OBSTETRICS & GYNECOLOGY

## 2018-08-27 PROCEDURE — 81001 URINALYSIS AUTO W/SCOPE: CPT | Performed by: OBSTETRICS & GYNECOLOGY

## 2018-08-27 RX ORDER — ACETAMINOPHEN 325 MG/1
650 TABLET ORAL EVERY 6 HOURS PRN
Status: DISCONTINUED | OUTPATIENT
Start: 2018-08-27 | End: 2018-08-27 | Stop reason: HOSPADM

## 2018-08-27 RX ORDER — ONDANSETRON 2 MG/ML
4 INJECTION INTRAMUSCULAR; INTRAVENOUS EVERY 4 HOURS PRN
Status: DISCONTINUED | OUTPATIENT
Start: 2018-08-27 | End: 2018-08-27 | Stop reason: HOSPADM

## 2018-08-27 RX ADMIN — ACETAMINOPHEN 650 MG: 325 TABLET, FILM COATED ORAL at 04:39

## 2018-08-27 NOTE — ED PROVIDER NOTES
History  Chief Complaint   Patient presents with    Abdominal Cramping     per pt "she has been having some abdominal cramping intermitently for about 12 hrs, per pt  she has a migraine making the right side of her face hurt and this came on prior to the cramping  pt  is 24 weeks pregenant "     51-year-old female 24 weeks gestation presents with lower pelvic abdominal pain  , states it comes in waves  , has been coming on for about the past 10 hours although fell asleep for 7 hours of it , when she noticed it was sporadic, then started becoming around every 15 minutes eventually fell asleep  Upon waking up due to the pain half an hour ago she has had 4 episode   States painful episodes last about 30 seconds to 1 minute  Feels like intense menstrual cramps  No associated vaginal bleeding no leakage of fluids, patient with positive and normal fetal movement  Patient also with a dull headache , 8/10, nonradiating no modifying or alleviating factors  Similar to previous headaches  Not thunderclap in onset  History provided by:  Patient and spouse      Prior to Admission Medications   Prescriptions Last Dose Informant Patient Reported? Taking? Prenatal Vit-Fe Fumarate-FA (PRENATAL 1+1 PO)   Yes Yes   Sig: Take by mouth   albuterol (PROVENTIL HFA,VENTOLIN HFA) 90 mcg/act inhaler   Yes Yes   Sig: Inhale 2 puffs every 4 (four) hours   aspirin 81 mg chewable tablet   Yes Yes   Sig: Chew 81 mg daily      Facility-Administered Medications: None       Past Medical History:   Diagnosis Date    [facility]     PCOS (polycystic ovarian syndrome)        Past Surgical History:   Procedure Laterality Date    OOPHORECTOMY         History reviewed  No pertinent family history  I have reviewed and agree with the history as documented      Social History   Substance Use Topics    Smoking status: Former Smoker     Packs/day: 0 20     Types: Cigarettes    Smokeless tobacco: Never Used    Alcohol use No Review of Systems   Constitutional: Negative for activity change, chills, diaphoresis and fever  HENT: Negative for congestion, sinus pressure and sore throat  Eyes: Negative for pain and visual disturbance  Respiratory: Negative for cough, chest tightness, shortness of breath, wheezing and stridor  Cardiovascular: Negative for chest pain and palpitations  Gastrointestinal: Positive for abdominal pain  Negative for abdominal distention, constipation, diarrhea, nausea and vomiting  Genitourinary: Negative for dysuria, frequency, vaginal bleeding, vaginal discharge and vaginal pain  Musculoskeletal: Negative for neck pain and neck stiffness  Skin: Negative for rash  Neurological: Positive for headaches  Negative for dizziness, speech difficulty, light-headedness and numbness  Physical Exam  Physical Exam   Constitutional: She is oriented to person, place, and time  She appears well-developed  No distress  Morbidly obese   HENT:   Head: Normocephalic and atraumatic  Eyes: Pupils are equal, round, and reactive to light  Neck: Normal range of motion  Neck supple  No tracheal deviation present  Cardiovascular: Normal rate, regular rhythm, normal heart sounds and intact distal pulses  No murmur heard  Pulmonary/Chest: Effort normal and breath sounds normal  No stridor  No respiratory distress  Abdominal: Soft  There is no tenderness  There is no rebound and no guarding  Gravid abdomen consistent with 24 weeks gestation   Musculoskeletal: Normal range of motion  Neurological: She is alert and oriented to person, place, and time  Skin: Skin is warm and dry  She is not diaphoretic  No erythema  No pallor  Psychiatric: She has a normal mood and affect  Vitals reviewed        Vital Signs  ED Triage Vitals [08/27/18 0425]   Temperature Pulse Respirations Blood Pressure SpO2   97 5 °F (36 4 °C) 80 20 141/81 99 %      Temp Source Heart Rate Source Patient Position - Orthostatic VS BP Location FiO2 (%)   Oral Monitor Lying Right arm --      Pain Score       8           Vitals:    18 0425   BP: 141/81   Pulse: 80   Patient Position - Orthostatic VS: Lying       Visual Acuity      ED Medications  Medications   acetaminophen (TYLENOL) tablet 650 mg (not administered)       Diagnostic Studies  Results Reviewed     None                 No orders to display              Procedures  Pelvic Ultrasound  Performed by: Stormy Carter  Authorized by: Stormy Carter     Procedure date/time:  2018 4:35 AM  Patient location:  ED  Procedure details:     Indications: pregnant with abdominal pain      Assess:  Fetal viability  Uterine findings:     Fetal heart rate (bpm):  144  Comments:      Fetus having spontaneous movements           Phone Contacts  ED Phone Contact    ED Course                               MDM  Number of Diagnoses or Management Options  24 weeks gestation of pregnancy: new and requires workup  Acute headache: new and requires workup  Pelvic cramping: new and requires workup  Diagnosis management comments: Twenty-four weeks gestation female presents with episodic pelvic cramping  There is becoming more frequent  , concern for possible  labor  , recommended patient be transferred to an OBGYN site  , patient here with her  who is requesting to drive her , [facility] will go to [facility]    Patient also headache will give 2 Tylenol prior to her discharge       Amount and/or Complexity of Data Reviewed  Decide to obtain previous medical records or to obtain history from someone other than the patient: yes  Obtain history from someone other than the patient: yes  Review and summarize past medical records: yes      CritCare Time    Disposition  Final diagnoses:   Pelvic cramping   24 weeks gestation of pregnancy   Acute headache     Time reflects when diagnosis was documented in both MDM as applicable and the Disposition within this note     Time User Action Codes Description Comment    8/27/2018  4:34 AM Sada ELIZALDE Add [R10 2] Pelvic cramping     8/27/2018  4:34 AM Iesha Loomis [Z3A 24] 24 weeks gestation of pregnancy     8/27/2018  4:34 AM Iesha Loomis [R51] Acute headache       ED Disposition     ED Disposition Condition Comment    Discharge  Tompa U  66  discharge to home/self care  Condition at discharge: Good        Follow-up Information    None         Patient's Medications   Discharge Prescriptions    No medications on file     No discharge procedures on file      ED Provider  Electronically Signed by           Jenelle Becerril DO  08/27/18 1800

## 2018-08-27 NOTE — PROGRESS NOTES
L&D Triage Note - OB/GYN  Teddy Styles 29 y o  female MRN: [id number]  Unit/Bed#: LD Triage  Encounter: [id number]      Assessment:  29 y o   at 21w3d presented with migraine and abdominal cramping sent from [facility] ED  Plan:  Assessment:  29 y o   at 25w4d who presents with a migraine and abdominal cramping, sent from the [facility] ED       Plan:  1  Pre-term labor work up: negative              - TVUS: 37-40mm   2  Pre-eclampsia labs:    - CBC: Hgb: 10 3   - CMP wnl   - UA - pending   - Urine protein: creatinine ratio -  25  3  Headache              - s/p Tylenol 4:39am              - Improved cocktail or Magnesium/Riboflavin cocktail   4  NST: reactive FHT, baseline 145  ______________________________________________________________________      Chief Compliant: headached    TIME: 0800  Subjective:  29 y o   at 21w3d brinda presented with migrain headache and abdominal cramping  At this time, patient is requesting to leave  Her headache has improved  Her headache is currently at 1/10  She had nausea but that has since improved  She is requesting a work note as she will be late for work this morning         Objective:  Vitals:    18 0735   BP: 139/76   Pulse: 82   Resp: 20   Temp: 98 1 °F (36 7 °C)       Sharon Bowens MD 2018 7:57 AM

## 2018-08-27 NOTE — PROGRESS NOTES
L&D Triage Note - OB/GYN  Osmin Aviles 29 y o  female MRN: [id number]  Unit/Bed#: LD Triage  Encounter: [id number]      Assessment:  29 y o   at 21w3d who presents with a migraine and abdominal cramping, sent from the [facility] ED  Plan:  1  Pre-term labor work up: negative   - TVUS: 37-40mm   2  Pre-eclampsia labs: CBC, CMP, UA, Urine protein: creatinine ratio    - Labs pending   3  Headache   - s/p Tylenol 4:39am   - Consider Fiorocet or Salumedrol, Magnesium cocktail or Magnesium/Riboflavin cocktail   4  NST: reactive FHT, baseline 145  ______________________________________________________________________      Chief Compliant: abdominal cramping and headache     TIME: 6:07  Subjective: Ms Thresea Kussmaul is a 29 y o   at 21w3d who presents with a migraine headache and abdominal cramping  The patient first presented to the ED with a headache that felt like intense pressure behind the right eye  She denies changes in vision and/or feelings of lightheadedness  She also started having abdominal cramping focused in the lower abdomen  The cramps feel like menstrual cramps  She also has some lower back pain  The pain started around 3:30 am and is intermittent  It comes and goes and lasts for about 20-30 seconds  Pregnancy complications: intermittent vaginal bleeding in the first trimester       The patient is a LVN patient     Leakage of fluid: no   Fetal movement: yes  Vaginal bleeding: no   Contractions: yes    Objective:  Vitals:    18 0551   BP: 146/71   Pulse: 94   Resp: 18       Physical Exam   Gen: no acute distress  Abdomen: soft, obese, gravid   Cervical exam: closed     KOH: negative  Wet mount: negative  FFN: collected, but not sent due to TVUS length       Emi Nguyen MD 2018 6:04 AM

## 2018-08-27 NOTE — PROCEDURES
Dante Munoz, a  at 25w4d with an JAIRO of 2018, Date entered prior to episode creation, was seen at [facility] for the following procedure(s): $Procedure Type: US - Transvaginal]                   Ultrasound Other  Fetal Presentation: Vertex  Cervical Length: 40  Funnel: Yes  Debris: No

## 2018-08-27 NOTE — DISCHARGE INSTRUCTIONS
Migraine Headache   WHAT YOU SHOULD KNOW:   A migraine is a severe headache  The pain can be so severe that it interferes with your daily activities  A migraine can last a few hours up to several days  The exact cause of migraines is not known  It may be caused by changes in your body chemicals and extra sensitive nerves in your brain  AFTER YOU LEAVE:   Medicines:  Take medicine as soon as you feel a migraine begin  · Pain medicine: You may need medicine to take away or decrease pain  You may need a doctor's order for this medicine  Do not wait until the pain is severe before you take your medicine  · Migraine medicines: These are used to help prevent a migraine or stop it once it starts  · Antinausea medicine: This medicine may be given to calm your stomach and to help prevent vomiting  They can also help relieve pain  · Take your medicine as directed  Call your healthcare provider if you think your medicine is not helping or if you have side effects  Tell him if you are allergic to any medicine  Keep a list of the medicines, vitamins, and herbs you take  Include the amounts, and when and why you take them  Bring the list or the pill bottles to follow-up visits  Carry your medicine list with you in case of an emergency  Manage your symptoms:   · Rest:  Rest in a dark, quiet room  This will help decrease your pain  · Ice:  Ice helps decrease pain  Use an ice pack or put crushed ice in a plastic bag  Cover the ice pack with a towel and place it on your head where it hurts for 15 to 20 minutes every hour  · Heat:  Heat helps decrease pain and muscle spasms  Use a small towel dampened with warm water or a heating pad, or sit in a warm bath  Apply heat on the area for 20 to 30 minutes every 2 hours  You may alternate heat and ice  Keep a headache diary:  Write down when your migraines start and stop  Include your symptoms and what you were doing when a migraine began   Record what you ate or drank for 24 hours before the migraine started  Describe the pain and where it hurts  Keep track of what you did to treat your migraine and whether it worked  Follow up with your primary healthcare provider or neurologist as directed:  Bring your headache diary with you when you see your primary healthcare provider  Write down your questions so you remember to ask them during your visits  Prevent another migraine:   · Do not smoke: If you smoke, it is never too late to quit  Tobacco smoke can trigger a migraine  It can also cause heart disease, lung disease, cancer, and other health problems  Quitting smoking will improve your health and the health of those around you  If you smoke, ask for information about how to stop  · Do not drink alcohol:  Alcohol can trigger a migraine  It can also interfere with the medicines used to treat your migraine  · Get regular exercise:  Exercise may help prevent migraines  Talk to your primary healthcare provider about the best exercise plan for you  · Manage stress:  Stress may trigger a migraine  Learn new ways to relax, such as deep breathing  · Stick to a sleep schedule:  Go to bed and get up at the same time each day  · Eat regular meals:  Include healthy foods such as include fruit, vegetables, whole-grain breads, low-fat dairy products, beans, lean meat, and fish  Avoid trigger foods like chocolate, hard cheese, and red wine  Foods that contain gluten, nitrates, MSG, or artificial sweeteners may also trigger migraines  Caffeine, which is often used to treat migraines, can also trigger them  Contact your primary healthcare provider or neurologist if:   · You have a fever  · Your migraines interfere with your daily activities  · Your medicines or treatments stop working  · You have questions about your condition or care    Seek care immediately or call 911 if:   · You have a headache that seems different or much worse than your usual migraine headache  · You have a severe headache with a fever or a stiff neck  · You have new problems with speech, vision, balance, or movement  · You feel like you are going to faint, you become confused, or you have a seizure  © 2014 [address] is for End User's use only and may not be sold, redistributed or otherwise used for commercial purposes  All illustrations and images included in CareNotes® are the copyrighted property of A D A M [employer] Inc  or [employer]  The above information is an  only  It is not intended as medical advice for individual conditions or treatments  Talk to your doctor, nurse or pharmacist before following any medical regimen to see if it is safe and effective for you

## 2018-08-27 NOTE — LETTER
[address] [address]  Dept: [phone]    August 27, 2018     Patient: Carmelita Lovelace   YOB: 1990   Date of Visit: 8/27/2018       To Whom it May Concern:    Monty Roblero is under my professional care  She was seen in the hospital on 8/27/2018  Please excuse her absence from work today  If you have any questions or concerns, please don't hesitate to call           Sincerely,              Memo Ervin MD

## 2018-08-29 LAB
BACTERIA UR CULT: NORMAL
BACTERIA UR CULT: NORMAL

## 2018-10-29 ENCOUNTER — HOSPITAL ENCOUNTER (EMERGENCY)
Facility: HOSPITAL | Age: 28
Discharge: HOME | End: 2018-10-29
Attending: EMERGENCY MEDICINE | Admitting: EMERGENCY MEDICINE
Payer: COMMERCIAL

## 2018-10-29 VITALS
DIASTOLIC BLOOD PRESSURE: 84 MMHG | SYSTOLIC BLOOD PRESSURE: 192 MMHG | HEART RATE: 84 BPM | TEMPERATURE: 98.1 F | RESPIRATION RATE: 16 BRPM | OXYGEN SATURATION: 98 %

## 2018-10-29 DIAGNOSIS — R11.10 VOMITING: Primary | ICD-10-CM

## 2018-10-29 DIAGNOSIS — R19.7 DIARRHEA: ICD-10-CM

## 2018-10-29 DIAGNOSIS — Z3A.33 33 WEEKS GESTATION OF PREGNANCY: ICD-10-CM

## 2018-10-29 PROCEDURE — 99283 EMERGENCY DEPT VISIT LOW MDM: CPT

## 2018-10-30 NOTE — ED PROVIDER NOTES
History  Chief Complaint   Patient presents with    Vomiting     started at 1am and approx 4 episodes, pt can drink water    Diarrhea     started at 1am and pt stated that she goes every 30 min, Pt 33 weeks pregnant    Contractions     Pt stated she thinks she is having contractions approx 20 min apart    Vaginal Discharge 3rd Trimester Pregnancy     Pt stated that she is having discharge that has an odor to it and is yellow in color     Patient presents to the emergency department with vomiting and diarrhea that began yesterday  She states she had vomiting yesterday and diarrhea today  No vomiting today  She is 33 weeks pregnant and about a week ago started to have which she feels may be contractions  Her OBGYN which is out a [facility] is aware of this  She states that the contractions of sped up and her last 1 was 1 hour ago and prior to that was a 0 5 hour  She states it is lasting 2 minutes induration  She denies vaginal bleeding or watery discharge but may have developed a yellow discharge in the last day or 2  She denies fever chills  She denies urinary symptoms  Denies back pain  Denies trauma  States she has normal fetal activity  This is her 1st pregnancy  She denies chest pain sob  Prior to Admission Medications   Prescriptions Last Dose Informant Patient Reported? Taking? Prenatal Vit-Fe Fumarate-FA (PRENATAL 1+1 PO)   Yes Yes   Sig: Take by mouth   albuterol (PROVENTIL HFA,VENTOLIN HFA) 90 mcg/act inhaler   Yes Yes   Sig: Inhale 2 puffs every 4 (four) hours   aspirin 81 mg chewable tablet   Yes Yes   Sig: Chew 81 mg daily      Facility-Administered Medications: None       Past Medical History:   Diagnosis Date    [facility]     PCOS (polycystic ovarian syndrome)        Past Surgical History:   Procedure Laterality Date    OOPHORECTOMY         History reviewed  No pertinent family history    I have reviewed and agree with the history as documented  Social History   Substance Use Topics    Smoking status: Former Smoker     Packs/day: 0 20     Types: Cigarettes    Smokeless tobacco: Never Used    Alcohol use No        Review of Systems   Constitutional: Negative  Negative for activity change, appetite change, chills, diaphoresis, fatigue and fever  HENT: Negative  Negative for congestion  Eyes: Negative  Negative for photophobia  Respiratory: Negative  Negative for cough, chest tightness, shortness of breath, wheezing and stridor  Cardiovascular: Negative  Negative for chest pain, palpitations and leg swelling  Gastrointestinal: Positive for abdominal pain, diarrhea, nausea and vomiting  Negative for abdominal distention, anal bleeding, blood in stool, constipation and rectal pain  Endocrine: Negative  Genitourinary: Positive for vaginal discharge  Negative for decreased urine volume, difficulty urinating, dysuria, flank pain, frequency, hematuria, pelvic pain, urgency, vaginal bleeding and vaginal pain  Musculoskeletal: Negative  Negative for back pain, neck pain and neck stiffness  Skin: Negative  Negative for rash and wound  Allergic/Immunologic: Negative  Neurological: Negative  Negative for dizziness, seizures, syncope, weakness, light-headedness and headaches  Hematological: Negative  Does not bruise/bleed easily  Psychiatric/Behavioral: Negative  Negative for confusion  Physical Exam  Physical Exam   Constitutional: She is oriented to person, place, and time  She appears well-developed and well-nourished  Nontoxic appearance without respiratory distress  Patient looks comfortable sitting upright in the stretcher  She can speak in full sentences and engage in normal conversation  HENT:   Head: Normocephalic and atraumatic     Right Ear: External ear normal    Left Ear: External ear normal    Mouth/Throat: Oropharynx is clear and moist    Eyes: Pupils are equal, round, and reactive to light  Conjunctivae and EOM are normal    Neck: Normal range of motion  Neck supple  Cardiovascular: Normal rate, regular rhythm, normal heart sounds and intact distal pulses  Pulmonary/Chest: Effort normal and breath sounds normal  No respiratory distress  She has no wheezes  She has no rales  She exhibits no tenderness  Abdominal: Soft  Bowel sounds are normal  She exhibits no distension and no mass  There is no tenderness  There is no rebound and no guarding  No hernia  No reproducible abdominal tenderness to palpation  No peritoneal signs  Musculoskeletal: Normal range of motion  She exhibits no edema, tenderness or deformity  Neurological: She is alert and oriented to person, place, and time  She has normal reflexes  She displays normal reflexes  No cranial nerve deficit or sensory deficit  She exhibits normal muscle tone  Coordination normal    Skin: Skin is warm and dry  No rash noted  No erythema  No pallor  Psychiatric: She has a normal mood and affect  Her behavior is normal  Judgment and thought content normal    Calm demeanor   Nursing note and vitals reviewed  Vital Signs  ED Triage Vitals [10/29/18 2007]   Temperature Pulse Respirations Blood Pressure SpO2   98 1 °F (36 7 °C) 84 16 (!) 192/84 98 %      Temp Source Heart Rate Source Patient Position - Orthostatic VS BP Location FiO2 (%)   Oral Monitor Sitting Left arm --      Pain Score       --           Vitals:    10/29/18 2007   BP: (!) 192/84   Pulse: 84   Patient Position - Orthostatic VS: Sitting       Visual Acuity      ED Medications  Medications - No data to display    Diagnostic Studies  Results Reviewed     None                 No orders to display              Procedures  Procedures       Phone Contacts  ED Phone Contact    ED Course  ED Course as of Oct 29 2040   Mon Oct 29, 2018   2029 Patient is stable at this time  Have some concern about pre-term labor  Fetal heart tones are in the 140s    Patient denies current discharge and has fetal activity  Her last contraction was 1 hour ago  This may be intestinal spasm in the setting of vomiting and diarrhea but will will allow this patient and her significant other to drive to [facility] for further evaluation  I offered ambulance transport but they felt comfortable to go via private transportation  They will go to the [facility] emergency department where she is scheduled to deliver her child  2039 Patient looks very comfortable at discharge  I again offered ambulance transport and I offered for this patient to go to [place] if she did not want to go to [facility]   She was comfortable going by car to [facility] and I am comfortable allowing her in her significant other to do so  She understands the importance of going directly to the emergency department  MDM  CritCare Time    Disposition  Final diagnoses:   Vomiting   Diarrhea   33 weeks gestation of pregnancy     Time reflects when diagnosis was documented in both MDM as applicable and the Disposition within this note     Time User Action Codes Description Comment    10/29/2018  8:31 PM Kamran Yao Add [R11 10] Vomiting     10/29/2018  8:31 PM Taz De Los Santos Add [R19 7] Diarrhea     10/29/2018  8:31 PM Taz De Los Santos Add [Z3A 33] 33 weeks gestation of pregnancy       ED Disposition     ED Disposition Condition Comment    Discharge  Tompa U  66  discharge to home/self care  Condition at discharge: Stable        Follow-up Information     Follow up With Specialties Details Why Contact Info       Go directly to [facility] for obstetric evaluation via the emergency department           Patient's Medications   Discharge Prescriptions    No medications on file     No discharge procedures on file      ED Provider  Electronically Signed by           Tyson Jimenez MD  10/29/18 2040

## 2018-10-30 NOTE — DISCHARGE INSTRUCTIONS
Acute Nausea and Vomiting, Ambulatory Care   GENERAL INFORMATION:   Acute nausea and vomiting  starts suddenly, gets worse quickly, and lasts a short time  Nausea and vomiting may be caused by pregnancy, alcohol, infection, or medicines  Common related symptoms include the following:   · Fever    · Abdominal swelling    · Pain, tenderness, or a lump in the abdomen    · Splashing sounds heard in your stomach when you move  Seek immediate care for the following symptoms:   · Blood in your vomit or bowel movements    · Sudden, severe pain in your chest and upper abdomen after hard vomiting    · Dizziness, dry mouth, and thirst    · Urinating very little or not at all    · Muscle weakness, leg cramps, and trouble breathing    · A heart beat that is faster than normal    · Vomiting for more than 48 hours  Treatment for acute nausea and vomiting  may include medicines to calm your stomach and stop the vomiting  You may need IV fluids if you are dehydrated  Manage your nausea and vomiting:   · Drink liquids as directed to prevent dehydration  Ask how much liquid to drink each day and which liquids are best for you  You may need to drink an oral rehydration solution (ORS)  ORS contains water, salts, and sugar that are needed to replace the lost body fluids  Ask what kind of ORS to use, how much to drink, and where to get it  · Eat smaller meals, more often  Eat small amounts of food every 2 to 3 hours, even if you are not hungry  Food in your stomach may help decrease your nausea  · Avoid stress  Find ways to relax and manage your stress  Headaches due to stress may cause nausea and vomiting  Get more rest and sleep  Follow up with your healthcare provider as directed:  Write down your questions so you remember to ask them during your visits  CARE AGREEMENT:   You have the right to help plan your care  Learn about your health condition and how it may be treated   Discuss treatment options with your caregivers to decide what care you want to receive  You always have the right to refuse treatment  The above information is an  only  It is not intended as medical advice for individual conditions or treatments  Talk to your doctor, nurse or pharmacist before following any medical regimen to see if it is safe and effective for you  © 2014 [address] is for End User's use only and may not be sold, redistributed or otherwise used for commercial purposes  All illustrations and images included in CareNotes® are the copyrighted property of [employer]  or Jordan Hodge  Acute Diarrhea   AMBULATORY CARE:   Acute diarrhea  starts quickly and lasts a short time, usually 1 to 3 days  It can last up to 2 weeks  Signs and symptoms that may happen with diarrhea:  You may have 3 or more episodes of diarrhea  It may be hard to control your diarrhea  You may also have any of the following:  · Fever and chills    · Headache or abdominal pain    · Nausea and vomiting    · Symptoms of dehydration such as thirst, decreased urination, dry skin, sunken eyes, or fast, pounding heartbeat  Seek care immediately if:   · You feel confused  · Your heartbeat is faster than normal      · Your eyes look deeply sunken, or you have no tears when you cry  · You urinate less than usual, or your urine is dark yellow  · You have blood or mucus in your stools  · You have severe abdominal pain  · You are unable to drink any liquids  Contact your healthcare provider if:  · Your symptoms do not get better with treatment  · You have a fever higher than 101 3°F (38 5°C)  · You have trouble eating and drinking because you are vomiting  · You are thirsty or have a dry mouth  · Your diarrhea does not get better in 7 days  · You have questions or concerns about your condition or care  Treatment for acute diarrhea  may include medicines to slow or stop your diarrhea   You may also need medicine to treat an infection  Self-care:   · Drink liquids as directed  Liquids will help prevent dehydration caused by diarrhea  Ask your healthcare provider how much liquid to drink each day and which liquids are best for you  You may need to drink an oral rehydration solution (ORS)  An ORS has the right amounts of water, salts, and sugar you need to replace body fluids  You can buy an ORS at most grocery stores and pharmacies  · Eat foods that are easy to digest   Examples include rice, lentils, cereal, bananas, potatoes, and bread  It also includes some fruits (bananas, melon), well-cooked vegetables, and lean meats  Avoid foods high in fiber, fat, and sugar  Also avoid caffeine, alcohol, dairy, and red meat until your diarrhea is gone  Prevent diarrhea:   · Wash your hands often  Use soap and water  Wash your hands before you eat or prepare food  Also wash your hands after you use the bathroom  Use an alcohol-based hand gel when soap and water are not available  · Keep bathroom surfaces clean  This helps prevent the spread of germs that cause acute diarrhea  · Wash fruits and vegetables well before you eat them  This can help remove germs that cause diarrhea  If possible, remove the skin from fruits and vegetables, or cook them well before you eat them  · Cook meat as directed  ¨ Cook ground meat  to 160°F      ¨ Cook ground poultry, whole poultry, or cuts of poultry  to at least 165°F  Remove the meat from heat  Let it stand for 3 minutes before you eat it  ¨ Cook whole cuts of meat other than poultry  to at least 145°F  Remove the meat from heat  Let it stand for 3 minutes before you eat it  · Wash dishes that have touched raw meat with hot water and soap  This includes cutting boards, utensils, dishes, and serving containers  · Place raw or cooked meat in the refrigerator as soon as possible    Bacteria can grow in meat that is left at room temperature too long  · Do not eat raw or undercooked oysters, clams, or mussels  These foods may be contaminated and cause infection  · Drink filtered or treated water only when you travel  Do not put ice in your drinks  Drink bottled water whenever possible  Follow up with your healthcare provider as directed:  Write down your questions so you remember to ask them during your visits  © 2017 [address] is for End User's use only and may not be sold, redistributed or otherwise used for commercial purposes  All illustrations and images included in CareNotes® are the copyrighted property of A D A M [employer] Inc  or Jordan Hodge  The above information is an  only  It is not intended as medical advice for individual conditions or treatments  Talk to your doctor, nurse or pharmacist before following any medical regimen to see if it is safe and effective for you  Pregnancy at 31 to [address]:   You may continue to have symptoms such as shortness of breath, heartburn, contractions, or swelling of your ankles and feet  You may be gaining about 1 pound a week now  DISCHARGE INSTRUCTIONS:   Return to the emergency department if:   · You develop a severe headache that does not go away  · You have new or increased vision changes, such as blurred or spotted vision  · You have new or increased swelling in your face or hands  · You have vaginal spotting or bleeding  · Your water broke or you feel warm water gushing or trickling from your vagina  Contact your healthcare provider if:   · You have more than 5 contractions in 1 hour  · You notice any changes in your baby's movements  · You have abdominal cramps, pressure, or tightening  · You have a change in vaginal discharge  · You have chills or a fever  · You have vaginal itching, burning, or pain       · You have yellow, green, white, or foul-smelling vaginal discharge  · You have pain or burning when you urinate, less urine than usual, or pink or bloody urine  · You have questions or concerns about your condition or care  How to care for yourself at this stage of your pregnancy:   · Eat a variety of healthy foods  Healthy foods include fruits, vegetables, whole-grain breads, low-fat dairy foods, beans, lean meats, and fish  Drink liquids as directed  Ask how much liquid to drink each day and which liquids are best for you  Limit caffeine to less than 200 milligrams each day  Limit your intake of fish to 2 servings each week  Choose fish low in mercury such as canned light tuna, shrimp, salmon, cod, or tilapia  Do not  eat fish high in mercury such as swordfish, tilefish, kanwal mackerel, and shark  · Manage heartburn  by eating 4 or 5 small meals each day instead of large meals  Avoid spicy food  · Manage swelling  by lying down and putting your feet up  · Take prenatal vitamins as directed  Your need for certain vitamins and minerals, such as folic acid, increases during pregnancy  Prenatal vitamins provide some of the extra vitamins and minerals you need  Prenatal vitamins may also help to decrease the risk of certain birth defects  · Talk to your healthcare provider about exercise  Moderate exercise can help you stay fit  Your healthcare provider will help you plan an exercise program that is safe for you during pregnancy  · Do not smoke  If you smoke, it is never too late to quit  Smoking increases your risk of a miscarriage and other health problems during your pregnancy  Smoking can cause your baby to be born too early or weigh less at birth  Ask your healthcare provider for information if you need help quitting  · Do not drink alcohol  Alcohol passes from your body to your baby through the placenta  It can affect your baby's brain development and cause fetal alcohol syndrome (FAS)   FAS is a group of conditions that causes mental, behavior, and growth problems  · Talk to your healthcare provider before you take any medicines  Many medicines may harm your baby if you take them when you are pregnant  Do not take any medicines, vitamins, herbs, or supplements without first talking to your healthcare provider  Never use illegal or street drugs (such as marijuana or cocaine) while you are pregnant  Safety tips during pregnancy:   · Avoid hot tubs and saunas  Do not use a hot tub or sauna while you are pregnant, especially during your first trimester  Hot tubs and saunas may raise your baby's temperature and increase the risk of birth defects  · Avoid toxoplasmosis  This is an infection caused by eating raw meat or being around infected cat feces  It can cause birth defects, miscarriages, and other problems  Wash your hands after you touch raw meat  Make sure any meat is well-cooked before you eat it  Avoid raw eggs and unpasteurized milk  Use gloves or ask someone else to clean your cat's litter box while you are pregnant  Changes that are happening with your baby:  By 34 weeks, your baby may weigh more than 5 pounds  Your baby will be about 12 ½ inches long from the top of the head to the rump (baby's bottom)  Your baby is gaining about ½ pound a week  Your baby's eyes open and close now  Your baby's kicks and movements are more forceful at this time  What you need to know about prenatal care: Your healthcare provider will check your blood pressure and weight  You may also need the following:  · A urine test  may also be done to check for sugar and protein  These can be signs of gestational diabetes or infection  Protein in your urine may also be a sign of preeclampsia  Preeclampsia is a condition that can develop during week 20 or later of your pregnancy  It causes high blood pressure, and it can cause problems with your kidneys and other organs  · A Tdap vaccine  may be recommended by your healthcare provider       · Fundal height  is a measurement of your uterus to check your baby's growth  This number is usually the same as the number of weeks that you have been pregnant  Your healthcare provider may also check your baby's position  · Your baby's heart rate  will be checked  © 2017 [address] Information is for End User's use only and may not be sold, redistributed or otherwise used for commercial purposes  All illustrations and images included in CareNotes® are the copyrighted property of [employer]  or Jordan Hodge  The above information is an  only  It is not intended as medical advice for individual conditions or treatments  Talk to your doctor, nurse or pharmacist before following any medical regimen to see if it is safe and effective for you

## 2018-10-30 NOTE — ED NOTES
Dr Norma Cr at bedside speaking with pt  Aware of fetal heart tones        Janelle Orozco RN  10/29/18 2028

## 2018-12-02 ENCOUNTER — HOSPITAL ENCOUNTER (EMERGENCY)
Facility: HOSPITAL | Age: 28
Discharge: HOME | End: 2018-12-03
Attending: EMERGENCY MEDICINE | Admitting: EMERGENCY MEDICINE
Payer: COMMERCIAL

## 2018-12-02 ENCOUNTER — APPOINTMENT (EMERGENCY)
Dept: RADIOLOGY | Facility: HOSPITAL | Age: 28
End: 2018-12-02
Payer: COMMERCIAL

## 2018-12-02 DIAGNOSIS — R07.9 CHEST PAIN: ICD-10-CM

## 2018-12-02 DIAGNOSIS — R20.0 LEFT FACIAL NUMBNESS: Primary | ICD-10-CM

## 2018-12-02 DIAGNOSIS — J18.9 PNEUMONIA: ICD-10-CM

## 2018-12-02 LAB
BASOPHILS # BLD AUTO: 0.04 THOUSANDS/ΜL (ref 0–0.1)
BASOPHILS NFR BLD AUTO: 1 % (ref 0–1)
EOSINOPHIL # BLD AUTO: 0.34 THOUSAND/ΜL (ref 0–0.61)
EOSINOPHIL NFR BLD AUTO: 5 % (ref 0–6)
ERYTHROCYTE [DISTWIDTH] IN BLOOD BY AUTOMATED COUNT: 14.6 % (ref 11.6–15.1)
HCT VFR BLD AUTO: 31.9 % (ref 34.8–46.1)
HGB BLD-MCNC: 9.5 G/DL (ref 11.5–15.4)
IMM GRANULOCYTES # BLD AUTO: 0.02 THOUSAND/UL (ref 0–0.2)
IMM GRANULOCYTES NFR BLD AUTO: 0 % (ref 0–2)
LYMPHOCYTES # BLD AUTO: 1.99 THOUSANDS/ΜL (ref 0.6–4.47)
LYMPHOCYTES NFR BLD AUTO: 27 % (ref 14–44)
MCH RBC QN AUTO: 23.3 PG (ref 26.8–34.3)
MCHC RBC AUTO-ENTMCNC: 29.8 G/DL (ref 31.4–37.4)
MCV RBC AUTO: 78 FL (ref 82–98)
MONOCYTES # BLD AUTO: 0.55 THOUSAND/ΜL (ref 0.17–1.22)
MONOCYTES NFR BLD AUTO: 8 % (ref 4–12)
NEUTROPHILS # BLD AUTO: 4.38 THOUSANDS/ΜL (ref 1.85–7.62)
NEUTS SEG NFR BLD AUTO: 59 % (ref 43–75)
NRBC BLD AUTO-RTO: 0 /100 WBCS
PLATELET # BLD AUTO: 377 THOUSANDS/UL (ref 149–390)
PMV BLD AUTO: 9.7 FL (ref 8.9–12.7)
RBC # BLD AUTO: 4.07 MILLION/UL (ref 3.81–5.12)
WBC # BLD AUTO: 7.32 THOUSAND/UL (ref 4.31–10.16)

## 2018-12-02 PROCEDURE — 99285 EMERGENCY DEPT VISIT HI MDM: CPT

## 2018-12-02 PROCEDURE — 93005 ELECTROCARDIOGRAM TRACING: CPT

## 2018-12-02 PROCEDURE — 84484 ASSAY OF TROPONIN QUANT: CPT | Performed by: EMERGENCY MEDICINE

## 2018-12-02 PROCEDURE — 36415 COLL VENOUS BLD VENIPUNCTURE: CPT

## 2018-12-02 PROCEDURE — 85610 PROTHROMBIN TIME: CPT | Performed by: EMERGENCY MEDICINE

## 2018-12-02 PROCEDURE — 85379 FIBRIN DEGRADATION QUANT: CPT | Performed by: EMERGENCY MEDICINE

## 2018-12-02 PROCEDURE — 80053 COMPREHEN METABOLIC PANEL: CPT | Performed by: EMERGENCY MEDICINE

## 2018-12-02 PROCEDURE — 85730 THROMBOPLASTIN TIME PARTIAL: CPT | Performed by: EMERGENCY MEDICINE

## 2018-12-02 PROCEDURE — 85025 COMPLETE CBC W/AUTO DIFF WBC: CPT | Performed by: EMERGENCY MEDICINE

## 2018-12-02 PROCEDURE — 71045 X-RAY EXAM CHEST 1 VIEW: CPT

## 2018-12-03 ENCOUNTER — APPOINTMENT (EMERGENCY)
Dept: RADIOLOGY | Facility: HOSPITAL | Age: 28
End: 2018-12-03
Payer: COMMERCIAL

## 2018-12-03 ENCOUNTER — APPOINTMENT (EMERGENCY)
Dept: RADIOLOGY | Facility: HOSPITAL | Age: 28
End: 2018-12-03
Attending: EMERGENCY MEDICINE
Payer: COMMERCIAL

## 2018-12-03 VITALS
WEIGHT: 293 LBS | RESPIRATION RATE: 24 BRPM | TEMPERATURE: 97.1 F | HEART RATE: 70 BPM | OXYGEN SATURATION: 98 % | DIASTOLIC BLOOD PRESSURE: 70 MMHG | SYSTOLIC BLOOD PRESSURE: 178 MMHG | BODY MASS INDEX: 51.7 KG/M2

## 2018-12-03 LAB
ALBUMIN SERPL BCP-MCNC: 2.6 G/DL (ref 3.5–5)
ALP SERPL-CCNC: 184 U/L (ref 46–116)
ALT SERPL W P-5'-P-CCNC: 25 U/L (ref 12–78)
ANION GAP SERPL CALCULATED.3IONS-SCNC: 9 MMOL/L (ref 4–13)
APTT PPP: 27 SECONDS (ref 26–38)
AST SERPL W P-5'-P-CCNC: 17 U/L (ref 5–45)
ATRIAL RATE: 80 BPM
BILIRUB SERPL-MCNC: 0.1 MG/DL (ref 0.2–1)
BUN SERPL-MCNC: 12 MG/DL (ref 5–25)
CALCIUM SERPL-MCNC: 8.8 MG/DL (ref 8.3–10.1)
CHLORIDE SERPL-SCNC: 107 MMOL/L (ref 100–108)
CO2 SERPL-SCNC: 27 MMOL/L (ref 21–32)
CREAT SERPL-MCNC: 0.68 MG/DL (ref 0.6–1.3)
DEPRECATED D DIMER PPP: 2210 NG/ML (FEU) (ref 190–520)
GFR SERPL CREATININE-BSD FRML MDRD: 119 ML/MIN/1.73SQ M
GLUCOSE SERPL-MCNC: 99 MG/DL (ref 65–140)
INR PPP: 0.95 (ref 0.86–1.16)
P AXIS: 35 DEGREES
POTASSIUM SERPL-SCNC: 3.7 MMOL/L (ref 3.5–5.3)
PR INTERVAL: 132 MS
PROT SERPL-MCNC: 6.3 G/DL (ref 6.4–8.2)
PROTHROMBIN TIME: 10 SECONDS (ref 9.4–11.7)
QRS AXIS: 2 DEGREES
QRSD INTERVAL: 104 MS
QT INTERVAL: 382 MS
QTC INTERVAL: 440 MS
SODIUM SERPL-SCNC: 143 MMOL/L (ref 136–145)
T WAVE AXIS: 17 DEGREES
TROPONIN I SERPL-MCNC: <0.02 NG/ML
VENTRICULAR RATE: 80 BPM

## 2018-12-03 PROCEDURE — 70498 CT ANGIOGRAPHY NECK: CPT

## 2018-12-03 PROCEDURE — 93010 ELECTROCARDIOGRAM REPORT: CPT | Performed by: INTERNAL MEDICINE

## 2018-12-03 PROCEDURE — 71046 X-RAY EXAM CHEST 2 VIEWS: CPT

## 2018-12-03 PROCEDURE — 70496 CT ANGIOGRAPHY HEAD: CPT

## 2018-12-03 RX ADMIN — IOHEXOL 100 ML: 350 INJECTION, SOLUTION INTRAVENOUS at 00:57

## 2018-12-03 NOTE — ED PROVIDER NOTES
History  Chief Complaint   Patient presents with    Chest Pain     pt gave birth on tuesday and 2 days ago started having left sided facial numbness, chest tightness and shortness of breath 1 nitro and 324 asa given in field     Pt in ER with c/o SOB and left facial numbness  She states that her symptoms have been intermittent since Thursday  Pt is 6 days s/p  at [facility]  Symptoms began while she was admitted to the hospital, and she states that she was seen by a neurologist and told that she was fine  Pt had a CTA on Friday during admission at [address] [place] [address] [address], which was neg for PE, and positive for pneumonia  She was started on Zithromax  Pt states that the numbness occurs intermittently with headaches, although at the time of initial eval, pt denies headaches  She admits that she was SLRA earlier tonight, but she didn't have the sensory deficits at that time, she was concerned about passing a large clot             None       Past Medical History:   Diagnosis Date    A-fib ([facility] [address] )     PCOS (polycystic ovarian syndrome)        Past Surgical History:   Procedure Laterality Date    OOPHORECTOMY         History reviewed  No pertinent family history  I have reviewed and agree with the history as documented  Social History   Substance Use Topics    Smoking status: Former Smoker     Packs/day: 0 20     Types: Cigarettes    Smokeless tobacco: Never Used    Alcohol use No        Review of Systems   Constitutional: Negative for chills and fever  Respiratory: Positive for shortness of breath  Negative for cough, chest tightness and wheezing  Gastrointestinal: Negative for abdominal pain, diarrhea, nausea and vomiting  Genitourinary: Negative for dysuria, frequency, hematuria and urgency  Musculoskeletal: Negative for back pain, neck pain and neck stiffness  Neurological: Positive for numbness and headaches  Negative for dizziness, facial asymmetry and light-headedness     All other systems reviewed and are negative  Physical Exam  Physical Exam   Constitutional: She appears well-developed and well-nourished  No distress  HENT:   Head: Normocephalic and atraumatic  Eyes: Pupils are equal, round, and reactive to light  Conjunctivae are normal    Neck: Normal range of motion  Neck supple  Cardiovascular: Normal rate, regular rhythm and normal heart sounds  No murmur heard  Pulmonary/Chest: Effort normal and breath sounds normal  No respiratory distress  Abdominal: Soft  Bowel sounds are normal  She exhibits no distension  There is no tenderness  Musculoskeletal: Normal range of motion  She exhibits no edema or deformity  Neurological: She is alert  No cranial nerve deficit  Skin: Skin is warm and dry  No rash noted  She is not diaphoretic  No pallor  Psychiatric: She has a normal mood and affect  Her behavior is normal    Nursing note and vitals reviewed        Vital Signs  ED Triage Vitals   Temperature Pulse Respirations Blood Pressure SpO2   12/03/18 0347 12/03/18 0100 12/03/18 0100 12/03/18 0100 12/03/18 0100   (!) 97 1 °F (36 2 °C) 70 (!) 24 (!) 178/70 98 %      Temp src Heart Rate Source Patient Position - Orthostatic VS BP Location FiO2 (%)   -- 12/03/18 0100 -- 12/03/18 0100 --    Monitor  Right arm       Pain Score       12/02/18 2351       5           Vitals:    12/03/18 0100   BP: (!) 178/70   Pulse: 70       Visual Acuity      ED Medications  Medications   iohexol (OMNIPAQUE) 350 MG/ML injection (MULTI-DOSE) 100 mL (100 mL Intravenous Given 12/3/18 0057)       Diagnostic Studies  Results Reviewed     Procedure Component Value Units Date/Time    Protime-INR [[id number]]  (Normal) Collected:  12/02/18 2352    Lab Status:  Final result Specimen:  Blood from Arm, Left Updated:  12/03/18 0021     Protime 10 0 seconds      INR 0 95    APTT [[id number]]  (Normal) Collected:  12/02/18 2352    Lab Status:  Final result Specimen:  Blood from Arm, Left Updated:  12/03/18 0021     PTT 27 seconds D-dimer, quantitative [[id number]]  (Abnormal) Collected:  12/02/18 2352    Lab Status:  Final result Specimen:  Blood from Arm, Left Updated:  12/03/18 0021     D-Dimer, Quant 2,210 (H) ng/ml (FEU)     Troponin I [[id number]]  (Normal) Collected:  12/02/18 2352    Lab Status:  Final result Specimen:  Blood from Arm, Left Updated:  12/03/18 0017     Troponin I <0 02 ng/mL     Comprehensive metabolic panel [[id number]]  (Abnormal) Collected:  12/02/18 2352    Lab Status:  Final result Specimen:  Blood from Arm, Left Updated:  12/03/18 0012     Sodium 143 mmol/L      Potassium 3 7 mmol/L      Chloride 107 mmol/L      CO2 27 mmol/L      ANION GAP 9 mmol/L      BUN 12 mg/dL      Creatinine 0 68 mg/dL      Glucose 99 mg/dL      Calcium 8 8 mg/dL      AST 17 U/L      ALT 25 U/L      Alkaline Phosphatase 184 (H) U/L      Total Protein 6 3 (L) g/dL      Albumin 2 6 (L) g/dL      Total Bilirubin 0 10 (L) mg/dL      eGFR 119 ml/min/1 73sq m     Narrative:         National Kidney Disease Education Program recommendations are as follows:  GFR calculation is accurate only with a steady state creatinine  Chronic Kidney disease less than 60 ml/min/1 73 sq  meters  Kidney failure less than 15 ml/min/1 73 sq  meters      CBC and differential [[id number]]  (Abnormal) Collected:  12/02/18 2352    Lab Status:  Final result Specimen:  Blood from Arm, Left Updated:  12/02/18 2357     WBC 7 32 Thousand/uL      RBC 4 07 Million/uL      Hemoglobin 9 5 (L) g/dL      Hematocrit 31 9 (L) %      MCV 78 (L) fL      MCH 23 3 (L) pg      MCHC 29 8 (L) g/dL      RDW 14 6 %      MPV 9 7 fL      Platelets 096 Thousands/uL      nRBC 0 /100 WBCs      Neutrophils Relative 59 %      Immat GRANS % 0 %      Lymphocytes Relative 27 %      Monocytes Relative 8 %      Eosinophils Relative 5 %      Basophils Relative 1 %      Neutrophils Absolute 4 38 Thousands/µL      Immature Grans Absolute 0 02 Thousand/uL      Lymphocytes Absolute 1 99 Thousands/µL      Monocytes Absolute 0 55 Thousand/µL      Eosinophils Absolute 0 34 Thousand/µL      Basophils Absolute 0 04 Thousands/µL                  CTA head and neck with and without contrast   Final Result by Christiano Marrufo MD (12/03 0146)      No evidence of acute intracranial abnormality  The left A1 is smaller than the right, however, both are patent and without evidence of focal stenosis  This is likely congenital   There is no evidence of hemodynamically significant stenosis or acute dissection  There is an area of airspace opacity within the left upper lobe of the lung which measures approximately 3 cm transverse dimension  This is suspicious for pneumonia  Clinical correlation and follow-up after treatment to ensure complete resolution is    recommended  The study was marked in [facility] for immediate notification  Workstation performed: [id number]         X-ray chest 1 view portable    (Results Pending)   XR chest 2 views    (Results Pending)              Procedures  Procedures       Phone Contacts  ED Phone Contact    ED Course                               MDM  Number of Diagnoses or Management Options  Chest pain:   Left facial numbness:   Pneumonia:   Diagnosis management comments: Pt is on the last day of Zithromax and aware of prev diagnosis of pneumonia  She will finish her course and f/u with PCP  D/w Dr Mario Naranjo  He agrees with current treatment plan  He recommends outpt f/u with Dr Charles Herrmann if pt would like to be discharged and outpt MRV  Informed pt about need for neuro f/u and outpt imaging  She refused to stay for d/c papers which included contact information of neurologist  Pt ambulated out of the ER without assistance and was in no distress at the time of d/c          Amount and/or Complexity of Data Reviewed  Clinical lab tests: ordered and reviewed  Tests in the radiology section of CPT®: ordered and reviewed    Risk of Complications, Morbidity, and/or Mortality  Presenting problems: moderate  Diagnostic procedures: moderate  Management options: moderate    Patient Progress  Patient progress: stable    CritCare Time    Disposition  Final diagnoses:   Left facial numbness   Chest pain   Pneumonia     Time reflects when diagnosis was documented in both MDM as applicable and the Disposition within this note     Time User Action Codes Description Comment    12/3/2018  3:48 AM Cirilo Gross O Add [R20 0] Left facial numbness     12/3/2018  3:48 AM Cirilo Gross O Add [R07 9] Chest pain     12/3/2018  3:48 AM Cirilo Gross Add [J18 9] Pneumonia       ED Disposition     ED Disposition Condition Comment    Discharge  Tompa U  66  discharge to home/self care  Condition at discharge: Stable        Follow-up Information     Follow up With Specialties Details Why Contact Info    Jerri Ansari MD Neurology Schedule an appointment as soon as possible for a visit in 1 day for follow up [facility] 18  [phone]            There are no discharge medications for this patient  No discharge procedures on file      ED Provider  Electronically Signed by           Maricarmen Lemos DO  12/03/18 8128

## 2018-12-03 NOTE — DISCHARGE INSTRUCTIONS
You must follow up with Dr Natalie Madrid for more testing, including an MRV  Call in the morning for the soonest appointment    Chest Pain   WHAT YOU NEED TO KNOW:   Chest pain can be caused by a range of conditions, from not serious to life-threatening  Chest pain can be a symptom of a digestive problem, such as acid reflux or a stomach ulcer  An anxiety attack or a strong emotion, such as anger, can also cause chest pain  Infection, inflammation, or a fracture in the bones or cartilage in your chest can cause pain or discomfort  Sometimes chest pain or pressure is caused by poor blood flow to your heart (angina)  Chest pain may also be caused by life-threatening conditions such as a heart attack or blood clot in your lungs  DISCHARGE INSTRUCTIONS:   Call 911 if:   · You have any of the following signs of a heart attack:      ¨ Squeezing, pressure, or pain in your chest that lasts longer than 5 minutes or returns    ¨ Discomfort or pain in your back, neck, jaw, stomach, or arm     ¨ Trouble breathing    ¨ Nausea or vomiting    ¨ Lightheadedness or a sudden cold sweat, especially with chest pain or trouble breathing    Return to the emergency department if:   · You have chest discomfort that gets worse, even with medicine  · You cough or vomit blood  · Your bowel movements are black or bloody  · You cannot stop vomiting, or it hurts to swallow  Contact your healthcare provider if:   · You have questions or concerns about your condition or care  Medicines:   · Medicines  may be given to treat the cause of your chest pain  Examples include pain medicine, anxiety medicine, or medicines to increase blood flow to your heart  · Do not take certain medicines without asking your healthcare provider first   These include NSAIDs, herbal or vitamin supplements, or hormones (estrogen or progestin)  · Take your medicine as directed    Contact your healthcare provider if you think your medicine is not helping or if you have side effects  Tell him or her if you are allergic to any medicine  Keep a list of the medicines, vitamins, and herbs you take  Include the amounts, and when and why you take them  Bring the list or the pill bottles to follow-up visits  Carry your medicine list with you in case of an emergency  Follow up with your healthcare provider within 72 hours, or as directed: You may need to return for more tests to find the cause of your chest pain  You may be referred to a specialist, such as a cardiologist or gastroenterologist  Write down your questions so you remember to ask them during your visits  Healthy living tips: The following are general healthy guidelines  If your chest pain is caused by a heart problem, your healthcare provider will give you specific guidelines to follow  · Do not smoke  Nicotine and other chemicals in cigarettes and cigars can cause lung and heart damage  Ask your healthcare provider for information if you currently smoke and need help to quit  E-cigarettes or smokeless tobacco still contain nicotine  Talk to your healthcare provider before you use these products  · Eat a variety of healthy, low-fat foods  Healthy foods include fruits, vegetables, whole-grain breads, low-fat dairy products, beans, lean meats, and fish  Ask for more information about a heart healthy diet  · Ask about activity  Your healthcare provider will tell you which activities to limit or avoid  Ask when you can drive, return to work, and have sex  Ask about the best exercise plan for you  · Maintain a healthy weight  Ask your healthcare provider how much you should weigh  Ask him or her to help you create a weight loss plan if you are overweight  · Get the flu and pneumonia vaccines  All adults should get the influenza (flu) vaccine  Get it every year as soon as it becomes available  The pneumococcal vaccine is given to adults aged 72 years or older   The vaccine is given every 5 years to prevent pneumococcal disease, such as pneumonia  © 2017 [facility] Information is for End User's use only and may not be sold, redistributed or otherwise used for commercial purposes  All illustrations and images included in CareNotes® are the copyrighted property of [employer]  or [employer]  The above information is an  only  It is not intended as medical advice for individual conditions or treatments  Talk to your doctor, nurse or pharmacist before following any medical regimen to see if it is safe and effective for you  Community Acquired Pneumonia   WHAT YOU NEED TO KNOW:   What is community-acquired pneumonia (CAP)? CAP is a lung infection that you get outside of a hospital or nursing home setting  Your lungs become inflamed and cannot work well  CAP may be caused by bacteria, viruses, or fungi  What increases my risk for CAP? · Chronic lung disease    · Cigarette smoking    · Brain disorders such as stroke, dementia, and cerebral palsy    · Weakened immune system    · Recent surgery or trauma    · Surgery for cancer of the mouth, throat, or neck    · Medical conditions such as diabetes or heart disease  What are the signs and symptoms of CAP?   · Cough that may bring up green, yellow, or bloody mucus    · Fever, chills, or severe shaking    · Shortness of breath    · Breathing and heartbeat that are faster than usual    · Pain in your chest or back when you breathe in or cough    · Fatigue and loss of appetite    · Trouble thinking clearly (especially in elderly people)  How is CAP diagnosed? Your healthcare provider will listen to your lungs for abnormal sounds  You may also need any of the following:  · X-ray or CT scan  pictures may show a lung infection or other problems, such as fluid around your lungs  You may be given contrast liquid to help your lungs show up better in the pictures   Tell the healthcare provider if you have ever had an allergic reaction to contrast liquid  · A pulse oximeter  is a device that measures the amount of oxygen in your blood  · Blood and sputum tests  may be done to check for the germ causing your infection  · Bronchoscopy  is a procedure to look inside your airway and learn the cause of your airway or lung condition  A bronchoscope (thin tube with a light) is inserted into your mouth and moved down your throat to your airway  You may be given medicine to numb your throat and help you relax during the procedure  Tissue and fluid may be collected from your airway or lungs to be tested  How is CAP treated? Treatment will depend on what type of germ is causing your CAP, and how bad your symptoms are  You may need antibiotics if your pneumonia is caused by bacteria  Antiviral medicines may be given if you have viral pneumonia  You may need medicines that dilate your bronchial tubes  You may need oxygen if your blood oxygen level is lower than it should be  You may need to be admitted to the hospital if your pneumonia is severe  What can I do to manage CAP?   · Do not smoke or allow others to smoke around you  Nicotine and other chemicals in cigarettes and cigars can cause lung damage  Ask your healthcare provider for information if you currently smoke and need help to quit  E-cigarettes or smokeless tobacco still contain nicotine  Talk to your healthcare provider before you use these products  · Breathe warm, moist air  This helps loosen mucus  Loosely place a warm, wet washcloth over your nose and mouth  A room humidifier may also help make the air moist     · Take deep breaths  Deep breaths help open your airway  Take 2 deep breaths and cough 2 or 3 times every hour  Coughing helps get mucus out of your body  · Drink liquids as directed  Ask your healthcare provider how much liquid to drink each day and which liquids to drink   Liquids help make mucus thin and easier to get out of your body     · Gently tap your chest   This helps loosen mucus so it is easier to cough  Lie with your head lower than your chest several times a day and tap your chest      · Get plenty of rest   Rest helps your body heal   How can I prevent CAP? · Wash your hands often with soap and water  Carry germ-killing hand gel with you  You can use the gel to clean your hands when soap and water are not available  Do not touch your eyes, nose, or mouth unless you have washed your hands first      · Clean surfaces often  Clean doorknobs, countertops, cell phones, and other surfaces that are touched often  · Always cover your mouth when you cough  Cough into a tissue or your shirtsleeve so you do not spread germs from your hands  · Try to avoid people who have a cold or the flu  If you are sick, stay away from others as much as possible  · Ask about vaccines  You may need a vaccine to help prevent pneumonia  Get an influenza (flu) vaccine every year as soon as it becomes available  When should I seek immediate care? · You are confused and cannot think clearly  · You have increased trouble breathing  · Your lips or fingernails turn gray or blue  When should I contact my healthcare provider? · Your symptoms do not get better, or get worse  · You are urinating less, or not at all  · You have questions or concerns about your condition or care  CARE AGREEMENT:   You have the right to help plan your care  Learn about your health condition and how it may be treated  Discuss treatment options with your caregivers to decide what care you want to receive  You always have the right to refuse treatment  The above information is an  only  It is not intended as medical advice for individual conditions or treatments  Talk to your doctor, nurse or pharmacist before following any medical regimen to see if it is safe and effective for you    © 2017 [employer] Information is for End User's use only and may not be sold, redistributed or otherwise used for commercial purposes  All illustrations and images included in CareNotes® are the copyrighted property of [employer] M [employer]  or Jordan Hodge

## 2019-01-23 ENCOUNTER — HOSPITAL ENCOUNTER (EMERGENCY)
Facility: HOSPITAL | Age: 29
Discharge: HOME | End: 2019-01-23
Attending: EMERGENCY MEDICINE | Admitting: EMERGENCY MEDICINE
Payer: COMMERCIAL

## 2019-01-23 VITALS
OXYGEN SATURATION: 95 % | HEART RATE: 68 BPM | BODY MASS INDEX: 51.62 KG/M2 | DIASTOLIC BLOOD PRESSURE: 70 MMHG | RESPIRATION RATE: 20 BRPM | SYSTOLIC BLOOD PRESSURE: 134 MMHG | TEMPERATURE: 98.2 F | WEIGHT: 293 LBS

## 2019-01-23 DIAGNOSIS — R00.2 PALPITATIONS: Primary | ICD-10-CM

## 2019-01-23 DIAGNOSIS — F41.9 ANXIETY: ICD-10-CM

## 2019-01-23 LAB
ATRIAL RATE: 59 BPM
P AXIS: 55 DEGREES
PR INTERVAL: 140 MS
QRS AXIS: 5 DEGREES
QRSD INTERVAL: 122 MS
QT INTERVAL: 440 MS
QTC INTERVAL: 435 MS
T WAVE AXIS: 44 DEGREES
VENTRICULAR RATE: 59 BPM

## 2019-01-23 PROCEDURE — 93005 ELECTROCARDIOGRAM TRACING: CPT

## 2019-01-23 PROCEDURE — 93010 ELECTROCARDIOGRAM REPORT: CPT | Performed by: INTERNAL MEDICINE

## 2019-01-23 PROCEDURE — 99285 EMERGENCY DEPT VISIT HI MDM: CPT

## 2019-01-23 NOTE — ED ATTENDING ATTESTATION
Justyna Lindo MD, saw and evaluated the patient  I have discussed the patient with the resident/non-physician practitioner and agree with the resident's/non-physician practitioner's findings, Plan of Care, and MDM as documented in the resident's/non-physician practitioner's note, except where noted  All available labs and Radiology studies were reviewed  At this point I agree with the current assessment done in the Emergency Department  I have conducted an independent evaluation of this patient a history and physical is as follows:     History fo afib with crdioversion Pt is not sleeping do to infant care Pt ahs been having fleeting chest pain and palpitations PE: alert tearful heart reg lungs clear abd soft nontender neuro nonfocal MDM: will do ekg  Pt was offered blood work which she is refusing at this time   Pt given strict return precautions   Critical Care Time  CritCare Time    Procedures

## 2019-01-23 NOTE — DISCHARGE INSTRUCTIONS
Anxiety   WHAT YOU SHOULD KNOW:   Anxiety is a condition that causes you to feel excessive worry, uneasiness, or fear  Family or work stress, smoking, caffeine, and alcohol can increase your risk for anxiety  Certain medicines or health conditions can also increase your risk  Anxiety may begin gradually, and can become a long-term condition if it is not managed or treated  AFTER YOU LEAVE:   Medicines:   · Medicines  can help you feel more calm and relaxed, and decrease your symptoms  · Take your medicine as directed  Contact your healthcare provider if you think your medicine is not helping or if you have side effects  Tell him if you are allergic to any medicine  Keep a list of the medicines, vitamins, and herbs you take  Include the amounts, and when and why you take them  Bring the list or the pill bottles to follow-up visits  Carry your medicine list with you in case of an emergency  Follow up with your healthcare provider within 2 weeks or as directed:  Write down your questions so you remember to ask them during your visits  Manage anxiety:   · Go to counseling as directed  Cognitive behavioral therapy can help you understand and change how you react to events that trigger your symptoms  · Find ways to manage your symptoms  Activities such as exercise, meditation, or listening to music can help you relax  · Practice deep breathing  Breathing can change how your body reacts to stress  Focus on taking slow, deep breaths several times a day, or during an anxiety attack  Breathe in through your nose, and out through your mouth  · Avoid caffeine  Caffeine can make your symptoms worse  Avoid foods or drinks that are meant to increase your energy level  · Limit or avoid alcohol  Ask your healthcare provider if alcohol is safe for you  You may not be able to drink alcohol if you take certain anxiety or depression medicines  Limit alcohol to 1 drink per day if you are a woman   Limit alcohol to 2 drinks per day if you are a man  A drink of alcohol is 12 ounces of beer, 5 ounces of wine, or 1½ ounces of liquor  Contact your healthcare provider if:   · Your symptoms get worse or do not get better with treatment  · You think your medicine may be causing side effects  · Your anxiety keeps you from doing your regular daily activities  · You have new symptoms since your last visit  · You have questions or concerns about your condition or care  Seek care immediately or call 911 if:   · You have chest pain, tightness, or heaviness that may spread to your shoulders, arms, jaw, neck, or back  · You feel like hurting yourself or someone else  · You feel dizzy, lightheaded, or faint  © 2014 [address] is for End User's use only and may not be sold, redistributed or otherwise used for commercial purposes  All illustrations and images included in CareNotes® are the copyrighted property of [employer]  or [employer] Hodge  The above information is an  only  It is not intended as medical advice for individual conditions or treatments  Talk to your doctor, nurse or pharmacist before following any medical regimen to see if it is safe and effective for you  Chest Pain   WHAT YOU NEED TO KNOW:   Chest pain can be caused by a range of conditions, from not serious to life-threatening  Chest pain can be a symptom of a digestive problem, such as acid reflux or a stomach ulcer  An anxiety attack or a strong emotion, such as anger, can also cause chest pain  Infection, inflammation, or a fracture in the bones or cartilage in your chest can cause pain or discomfort  Sometimes chest pain or pressure is caused by poor blood flow to your heart (angina)  Chest pain may also be caused by life-threatening conditions such as a heart attack or blood clot in your lungs     DISCHARGE INSTRUCTIONS:   Call 911 if:   · You have any of the following signs of a heart attack:      ¨ Squeezing, pressure, or pain in your chest that lasts longer than 5 minutes or returns    ¨ Discomfort or pain in your back, neck, jaw, stomach, or arm     ¨ Trouble breathing    ¨ Nausea or vomiting    ¨ Lightheadedness or a sudden cold sweat, especially with chest pain or trouble breathing    Return to the emergency department if:   · You have chest discomfort that gets worse, even with medicine  · You cough or vomit blood  · Your bowel movements are black or bloody  · You cannot stop vomiting, or it hurts to swallow  Contact your healthcare provider if:   · You have questions or concerns about your condition or care  Medicines:   · Medicines  may be given to treat the cause of your chest pain  Examples include pain medicine, anxiety medicine, or medicines to increase blood flow to your heart  · Do not take certain medicines without asking your healthcare provider first   These include NSAIDs, herbal or vitamin supplements, or hormones (estrogen or progestin)  · Take your medicine as directed  Contact your healthcare provider if you think your medicine is not helping or if you have side effects  Tell him or her if you are allergic to any medicine  Keep a list of the medicines, vitamins, and herbs you take  Include the amounts, and when and why you take them  Bring the list or the pill bottles to follow-up visits  Carry your medicine list with you in case of an emergency  Follow up with your healthcare provider within 72 hours, or as directed: You may need to return for more tests to find the cause of your chest pain  You may be referred to a specialist, such as a cardiologist or gastroenterologist  Write down your questions so you remember to ask them during your visits  Healthy living tips: The following are general healthy guidelines  If your chest pain is caused by a heart problem, your healthcare provider will give you specific guidelines to follow    · Do not smoke   Nicotine and other chemicals in cigarettes and cigars can cause lung and heart damage  Ask your healthcare provider for information if you currently smoke and need help to quit  E-cigarettes or smokeless tobacco still contain nicotine  Talk to your healthcare provider before you use these products  · Eat a variety of healthy, low-fat foods  Healthy foods include fruits, vegetables, whole-grain breads, low-fat dairy products, beans, lean meats, and fish  Ask for more information about a heart healthy diet  · Ask about activity  Your healthcare provider will tell you which activities to limit or avoid  Ask when you can drive, return to work, and have sex  Ask about the best exercise plan for you  · Maintain a healthy weight  Ask your healthcare provider how much you should weigh  Ask him or her to help you create a weight loss plan if you are overweight  · Get the flu and pneumonia vaccines  All adults should get the influenza (flu) vaccine  Get it every year as soon as it becomes available  The pneumococcal vaccine is given to adults aged 72 years or older  The vaccine is given every 5 years to prevent pneumococcal disease, such as pneumonia  © 2017 [facility] Information is for End User's use only and may not be sold, redistributed or otherwise used for commercial purposes  All illustrations and images included in CareNotes® are the copyrighted property of [employer]  or Jordan Hodge  The above information is an  only  It is not intended as medical advice for individual conditions or treatments  Talk to your doctor, nurse or pharmacist before following any medical regimen to see if it is safe and effective for you

## 2019-01-24 NOTE — ED PROVIDER NOTES
History  Chief Complaint   Patient presents with    Palpitations     c/o palpitations, constant heaviness in chest  Hx of A-fib, cardioversion  HPI  Patient is a 80-year-old woman comes in with palpitations, heaviness in her chest   Patient does have a positive cardiac history of atrial fibrillation status post cardioversion years ago  Patient had a recent birth of her child few months ago  Patient is not sleeping  She states that she is having anxiety every day, when she is not sleeping, she finds that she is more anxious and that is when she has the chest heaviness  At no time has she had presyncope syncope, diaphoresis  Patient does not have any chest pain at this time  Denies any nausea vomiting  As I was interviewing the patient, she stated she feels that this is all anxiety related  She consented to an EKG I discussed doing labs in the setting of her history, she deferred  She was willing is with attending provider  Patient otherwise denies fevers chills sweats, shortness of breath abdominal pain nausea vomiting  None       Past Medical History:   Diagnosis Date    [facility]     PCOS (polycystic ovarian syndrome)        Past Surgical History:   Procedure Laterality Date    OOPHORECTOMY      OOPHORECTOMY Right        No family history on file  I have reviewed and agree with the history as documented  Social History   Substance Use Topics    Smoking status: Former Smoker     Packs/day: 0 20     Types: Cigarettes    Smokeless tobacco: Never Used    Alcohol use No        Review of Systems   Constitutional: Negative  Negative for chills and fever  HENT: Negative  Negative for congestion and sore throat  Eyes: Negative  Negative for discharge and redness  Respiratory: Positive for chest tightness  Negative for shortness of breath  Cardiovascular: Negative  Negative for chest pain and palpitations  Gastrointestinal: Negative    Negative for abdominal pain, nausea and vomiting  Endocrine: Negative  Negative for cold intolerance and polyphagia  Genitourinary: Negative  Negative for difficulty urinating and dysuria  Musculoskeletal: Negative  Negative for arthralgias and back pain  Skin: Negative  Negative for color change and wound  Allergic/Immunologic: Negative  Negative for environmental allergies  Neurological: Negative  Negative for dizziness, weakness and headaches  Hematological: Negative  Psychiatric/Behavioral: Positive for dysphoric mood  Negative for behavioral problems and suicidal ideas  The patient is nervous/anxious  All other systems reviewed and are negative  Physical Exam  ED Triage Vitals [01/23/19 1232]   Temperature Pulse Respirations Blood Pressure SpO2   98 2 °F (36 8 °C) 98 18 132/60 98 %      Temp Source Heart Rate Source Patient Position - Orthostatic VS BP Location FiO2 (%)   Oral Monitor Sitting Right arm --      Pain Score       5           Orthostatic Vital Signs  Vitals:    01/23/19 1232 01/23/19 1349 01/23/19 1400 01/23/19 1430   BP: 132/60 135/72 123/59 134/70   Pulse: 98 70 68 68   Patient Position - Orthostatic VS: Sitting Lying Lying Lying       Physical Exam   Constitutional: She is oriented to person, place, and time  She appears well-developed and well-nourished  No distress  HENT:   Head: Normocephalic and atraumatic  Right Ear: External ear normal    Left Ear: External ear normal    Mouth/Throat: Oropharynx is clear and moist    Eyes: Pupils are equal, round, and reactive to light  Conjunctivae and EOM are normal  Right eye exhibits no discharge  Left eye exhibits no discharge  No scleral icterus  Neck: Normal range of motion  Neck supple  No tracheal deviation present  No thyromegaly present  Cardiovascular: Normal rate, regular rhythm and intact distal pulses  Exam reveals no gallop and no friction rub  No murmur heard  Pulmonary/Chest: Effort normal and breath sounds normal  No stridor   No respiratory distress  She has no wheezes  She has no rales  Abdominal: Soft  Bowel sounds are normal  She exhibits no distension  There is no tenderness  There is no rebound and no guarding  Musculoskeletal: Normal range of motion  She exhibits no edema or deformity  Neurological: She is alert and oriented to person, place, and time  No cranial nerve deficit  Skin: Skin is warm and dry  Capillary refill takes less than 2 seconds  No rash noted  She is not diaphoretic  No erythema  Psychiatric: Thought content normal    Patient depressed but denies suicidal or homicidal ideation   Nursing note and vitals reviewed        ED Medications  Medications - No data to display    Diagnostic Studies  Results Reviewed     None                 No orders to display         Procedures  ECG 12 Lead Documentation  Date/Time: 1/23/2019 12:39 PM  Performed by: Varun Shah by: Deysi Pelletier     Indications / Diagnosis:  Chest pain  ECG reviewed by me, the ED Provider: no    Patient location:  ED  Previous ECG:     Previous ECG:  Compared to current    Similarity:  No change    Comparison to cardiac monitor: Yes    Interpretation:     Interpretation: normal    Rate:     ECG rate:  59    ECG rate assessment: bradycardic    Rhythm:     Rhythm: sinus rhythm    Ectopy:     Ectopy: none    QRS:     QRS axis:  Normal    QRS intervals:  Normal  Conduction:     Conduction: normal    ST segments:     ST segments:  Normal  T waves:     T waves: normal              Phone Consults  ECG 12 Lead Documentation  Date/Time: 1/23/2019 12:39 PM  Performed by: Deysi Pelletier  Authorized by: Deysi Pelletier     Indications / Diagnosis:  Chest pain  ECG reviewed by me, the ED Provider: no    Patient location:  ED  Previous ECG:     Previous ECG:  Compared to current    Similarity:  No change    Comparison to cardiac monitor: Yes    Interpretation:     Interpretation: normal    Rate:     ECG rate:  59    ECG rate assessment: bradycardic    Rhythm:     Rhythm: sinus rhythm    Ectopy:     Ectopy: none    QRS:     QRS axis:  Normal    QRS intervals:  Normal  Conduction:     Conduction: normal    ST segments:     ST segments:  Normal  T waves:     T waves: normal          ED Course                               MDM  Number of Diagnoses or Management Options  Anxiety:   Palpitations:   Diagnosis management comments: 51-year-old woman presents for evaluation chest tightness, she thinks it is secondary to anxiety  EKG was already done  Patient is denying labs at this time  Discussed with patient risks of ACS, patient again states she thinks it is anxiety and just needs someone to talk to  Will defer labs at this  Patient is stressed out being a new mother, she denies any thoughts of harm to herself her baby or anybody else  Patient feels well enough would like to go  Patient will be discharged  CritCare Time    Disposition  Final diagnoses:   Palpitations   Anxiety     Time reflects when diagnosis was documented in both MDM as applicable and the Disposition within this note     Time User Action Codes Description Comment    1/23/2019  2:27 PM Destini Memory Add [R00 2] Palpitations     1/23/2019  2:28 PM [facility] Add [F41 9] Anxiety       ED Disposition     ED Disposition Condition Date/Time Comment    Discharge  Wed Jan 23, 2019  2:45 PM Isabel Collins discharge to home/self care      Condition at discharge: Stable        Follow-up Information     Follow up With Specialties Details Why [address] Emergency Department Emergency Medicine Go to As needed, If symptoms worsen [address]  [phone]  ED, [address] [place] [address], [address], DO Family Medicine Schedule an appointment as soon as possible for a visit in 1 week To follow up being seen in the emergency department [facility] L[facility] Cardiology Schedule an appointment as soon as possible for a visit in 1 week As needed, If symptoms worsen [address] [facility] [phone]  [address], [place], [place], [address]          There are no discharge medications for this patient  No discharge procedures on file  ED Provider  Attending physically available and evaluated Elissa Feliz I managed the patient along with the ED Attending      Electronically Signed by         Reji Ball MD  01/30/19 7315

## 2019-02-13 ENCOUNTER — HOSPITAL ENCOUNTER (EMERGENCY)
Facility: HOSPITAL | Age: 29
Discharge: HOME | End: 2019-02-13
Attending: EMERGENCY MEDICINE | Admitting: EMERGENCY MEDICINE
Payer: COMMERCIAL

## 2019-02-13 ENCOUNTER — APPOINTMENT (EMERGENCY)
Dept: CT IMAGING | Facility: HOSPITAL | Age: 29
End: 2019-02-13
Payer: COMMERCIAL

## 2019-02-13 VITALS
DIASTOLIC BLOOD PRESSURE: 60 MMHG | SYSTOLIC BLOOD PRESSURE: 125 MMHG | RESPIRATION RATE: 18 BRPM | OXYGEN SATURATION: 97 % | HEART RATE: 63 BPM | BODY MASS INDEX: 49.91 KG/M2 | TEMPERATURE: 98.1 F | WEIGHT: 293 LBS

## 2019-02-13 DIAGNOSIS — D72.10 EOSINOPHILIA: ICD-10-CM

## 2019-02-13 DIAGNOSIS — R00.2 PALPITATIONS: ICD-10-CM

## 2019-02-13 DIAGNOSIS — R10.12 LEFT UPPER QUADRANT PAIN: Primary | ICD-10-CM

## 2019-02-13 DIAGNOSIS — N39.0 UTI (URINARY TRACT INFECTION): ICD-10-CM

## 2019-02-13 DIAGNOSIS — R74.8 ELEVATED SERUM ALKALINE PHOSPHATASE LEVEL: ICD-10-CM

## 2019-02-13 LAB
ALBUMIN SERPL BCP-MCNC: 3.7 G/DL (ref 3.5–5)
ALP SERPL-CCNC: 208 U/L (ref 46–116)
ALT SERPL W P-5'-P-CCNC: 47 U/L (ref 12–78)
ANION GAP SERPL CALCULATED.3IONS-SCNC: 10 MMOL/L (ref 4–13)
AST SERPL W P-5'-P-CCNC: 25 U/L (ref 5–45)
ATRIAL RATE: 65 BPM
B-HCG SERPL-ACNC: <2 MIU/ML
BACTERIA UR QL AUTO: ABNORMAL /HPF
BASOPHILS # BLD AUTO: 0.11 THOUSANDS/ΜL (ref 0–0.1)
BASOPHILS NFR BLD AUTO: 1 % (ref 0–1)
BILIRUB SERPL-MCNC: 0.2 MG/DL (ref 0.2–1)
BILIRUB UR QL STRIP: NEGATIVE
BUN SERPL-MCNC: 8 MG/DL (ref 5–25)
CALCIUM SERPL-MCNC: 9.3 MG/DL (ref 8.3–10.1)
CHLORIDE SERPL-SCNC: 103 MMOL/L (ref 100–108)
CLARITY UR: CLEAR
CO2 SERPL-SCNC: 28 MMOL/L (ref 21–32)
COLOR UR: YELLOW
CREAT SERPL-MCNC: 0.67 MG/DL (ref 0.6–1.3)
EOSINOPHIL # BLD AUTO: 1.37 THOUSAND/ΜL (ref 0–0.61)
EOSINOPHIL NFR BLD AUTO: 13 % (ref 0–6)
ERYTHROCYTE [DISTWIDTH] IN BLOOD BY AUTOMATED COUNT: 15.9 % (ref 11.6–15.1)
EXT PREG TEST URINE: NEGATIVE
GFR SERPL CREATININE-BSD FRML MDRD: 120 ML/MIN/1.73SQ M
GLUCOSE SERPL-MCNC: 91 MG/DL (ref 65–140)
GLUCOSE UR STRIP-MCNC: NEGATIVE MG/DL
HCT VFR BLD AUTO: 39.1 % (ref 34.8–46.1)
HGB BLD-MCNC: 12 G/DL (ref 11.5–15.4)
HGB UR QL STRIP.AUTO: NEGATIVE
IMM GRANULOCYTES # BLD AUTO: 0.04 THOUSAND/UL (ref 0–0.2)
IMM GRANULOCYTES NFR BLD AUTO: 0 % (ref 0–2)
KETONES UR STRIP-MCNC: NEGATIVE MG/DL
LACTATE SERPL-SCNC: 0.8 MMOL/L (ref 0.5–2)
LEUKOCYTE ESTERASE UR QL STRIP: ABNORMAL
LIPASE SERPL-CCNC: 124 U/L (ref 73–393)
LYMPHOCYTES # BLD AUTO: 2.45 THOUSANDS/ΜL (ref 0.6–4.47)
LYMPHOCYTES NFR BLD AUTO: 24 % (ref 14–44)
MCH RBC QN AUTO: 23.5 PG (ref 26.8–34.3)
MCHC RBC AUTO-ENTMCNC: 30.7 G/DL (ref 31.4–37.4)
MCV RBC AUTO: 77 FL (ref 82–98)
MONOCYTES # BLD AUTO: 0.65 THOUSAND/ΜL (ref 0.17–1.22)
MONOCYTES NFR BLD AUTO: 6 % (ref 4–12)
MUCOUS THREADS UR QL AUTO: ABNORMAL
NEUTROPHILS # BLD AUTO: 5.67 THOUSANDS/ΜL (ref 1.85–7.62)
NEUTS SEG NFR BLD AUTO: 56 % (ref 43–75)
NITRITE UR QL STRIP: NEGATIVE
NON-SQ EPI CELLS URNS QL MICRO: ABNORMAL /HPF
NRBC BLD AUTO-RTO: 0 /100 WBCS
P AXIS: 68 DEGREES
PH UR STRIP.AUTO: 6 [PH] (ref 4.5–8)
PLATELET # BLD AUTO: 359 THOUSANDS/UL (ref 149–390)
PMV BLD AUTO: 10 FL (ref 8.9–12.7)
POTASSIUM SERPL-SCNC: 4 MMOL/L (ref 3.5–5.3)
PR INTERVAL: 142 MS
PROT SERPL-MCNC: 7.6 G/DL (ref 6.4–8.2)
PROT UR STRIP-MCNC: NEGATIVE MG/DL
QRS AXIS: 1 DEGREES
QRSD INTERVAL: 106 MS
QT INTERVAL: 398 MS
QTC INTERVAL: 413 MS
RBC # BLD AUTO: 5.1 MILLION/UL (ref 3.81–5.12)
RBC #/AREA URNS AUTO: ABNORMAL /HPF
SODIUM SERPL-SCNC: 141 MMOL/L (ref 136–145)
SP GR UR STRIP.AUTO: >=1.03 (ref 1–1.03)
T WAVE AXIS: 28 DEGREES
TSH SERPL DL<=0.05 MIU/L-ACNC: 1.88 UIU/ML (ref 0.36–3.74)
UROBILINOGEN UR QL STRIP.AUTO: 0.2 E.U./DL
VENTRICULAR RATE: 65 BPM
WBC # BLD AUTO: 10.29 THOUSAND/UL (ref 4.31–10.16)
WBC #/AREA URNS AUTO: ABNORMAL /HPF

## 2019-02-13 PROCEDURE — 74177 CT ABD & PELVIS W/CONTRAST: CPT

## 2019-02-13 PROCEDURE — 96361 HYDRATE IV INFUSION ADD-ON: CPT

## 2019-02-13 PROCEDURE — 83605 ASSAY OF LACTIC ACID: CPT | Performed by: EMERGENCY MEDICINE

## 2019-02-13 PROCEDURE — 93010 ELECTROCARDIOGRAM REPORT: CPT | Performed by: INTERNAL MEDICINE

## 2019-02-13 PROCEDURE — 81001 URINALYSIS AUTO W/SCOPE: CPT

## 2019-02-13 PROCEDURE — 85025 COMPLETE CBC W/AUTO DIFF WBC: CPT | Performed by: EMERGENCY MEDICINE

## 2019-02-13 PROCEDURE — 36415 COLL VENOUS BLD VENIPUNCTURE: CPT | Performed by: EMERGENCY MEDICINE

## 2019-02-13 PROCEDURE — 93005 ELECTROCARDIOGRAM TRACING: CPT

## 2019-02-13 PROCEDURE — 96374 THER/PROPH/DIAG INJ IV PUSH: CPT

## 2019-02-13 PROCEDURE — 83690 ASSAY OF LIPASE: CPT | Performed by: EMERGENCY MEDICINE

## 2019-02-13 PROCEDURE — 84443 ASSAY THYROID STIM HORMONE: CPT | Performed by: EMERGENCY MEDICINE

## 2019-02-13 PROCEDURE — 87086 URINE CULTURE/COLONY COUNT: CPT | Performed by: EMERGENCY MEDICINE

## 2019-02-13 PROCEDURE — 80053 COMPREHEN METABOLIC PANEL: CPT | Performed by: EMERGENCY MEDICINE

## 2019-02-13 PROCEDURE — 99285 EMERGENCY DEPT VISIT HI MDM: CPT

## 2019-02-13 PROCEDURE — 81003 URINALYSIS AUTO W/O SCOPE: CPT

## 2019-02-13 PROCEDURE — 81025 URINE PREGNANCY TEST: CPT | Performed by: EMERGENCY MEDICINE

## 2019-02-13 PROCEDURE — 84702 CHORIONIC GONADOTROPIN TEST: CPT | Performed by: EMERGENCY MEDICINE

## 2019-02-13 RX ORDER — CEPHALEXIN 250 MG/1
500 CAPSULE ORAL ONCE
Status: COMPLETED | OUTPATIENT
Start: 2019-02-13 | End: 2019-02-13

## 2019-02-13 RX ORDER — CEPHALEXIN 500 MG/1
500 CAPSULE ORAL 4 TIMES DAILY
Qty: 28 CAPSULE | Refills: 0 | Status: SHIPPED | OUTPATIENT
Start: 2019-02-13 | End: 2019-02-20

## 2019-02-13 RX ORDER — SODIUM CHLORIDE 9 MG/ML
125 INJECTION, SOLUTION INTRAVENOUS CONTINUOUS
Status: DISCONTINUED | OUTPATIENT
Start: 2019-02-13 | End: 2019-02-13 | Stop reason: HOSPADM

## 2019-02-13 RX ORDER — OMEPRAZOLE 20 MG/1
20 CAPSULE, DELAYED RELEASE ORAL DAILY
Qty: 20 CAPSULE | Refills: 0 | Status: SHIPPED | OUTPATIENT
Start: 2019-02-13 | End: 2019-03-05

## 2019-02-13 RX ADMIN — SODIUM CHLORIDE 1000 ML: 0.9 INJECTION, SOLUTION INTRAVENOUS at 03:13

## 2019-02-13 RX ADMIN — IOHEXOL 100 ML: 350 INJECTION, SOLUTION INTRAVENOUS at 03:55

## 2019-02-13 RX ADMIN — FAMOTIDINE 20 MG: 10 INJECTION, SOLUTION INTRAVENOUS at 03:15

## 2019-02-13 RX ADMIN — CEPHALEXIN 500 MG: 250 CAPSULE ORAL at 04:47

## 2019-02-13 NOTE — ED NOTES
Patient transported to [facility] [address], [address] [place] [address]  02/13/19 0511

## 2019-02-13 NOTE — ED PROVIDER NOTES
History  Chief Complaint   Patient presents with    Abdominal Pain     Per EMS patient woke up from sleep with 10/10 right epigastric pain  Patient states that pain is center and under left breast and she had a fast heart beat when she woke up  Denies n/v/d  History of Afib   Patient is a 29year old female with worsening LUQ abdominal pain since yesterday  Decreased appetite  No N/V/d  No fever  No urinary sx  LMP - 2 weeks ago  Has had prior R oophorectomy in the past  (+) palpitations  Has had prior a  Fib in 2017  Was last seen at [facility] ED on 19 for palpitations and anxiety  [facility] checked on this patient and no Rx found  No GI bleeding  No prior pain like this before  Did not drive here  Temp was 100 3 in the ambulance  History provided by:  Patient and EMS personnel   used: No        None       Past Medical History:   Diagnosis Date    A-fib ([address] )     PCOS (polycystic ovarian syndrome)        Past Surgical History:   Procedure Laterality Date    OOPHORECTOMY      OOPHORECTOMY Right        History reviewed  No pertinent family history  I have reviewed and agree with the history as documented  Social History     Tobacco Use    Smoking status: Former Smoker     Packs/day: 0 20     Types: Cigarettes     Last attempt to quit: 2018     Years since quittin 0    Smokeless tobacco: Never Used   Substance Use Topics    Alcohol use: No    Drug use: No        Review of Systems   Constitutional: Positive for appetite change  Negative for fever  Gastrointestinal: Positive for abdominal pain  Negative for blood in stool, diarrhea, nausea and vomiting  Genitourinary: Negative for difficulty urinating  All other systems reviewed and are negative  Physical Exam  Physical Exam   Constitutional: She is oriented to person, place, and time  She appears well-developed and well-nourished  She appears distressed (mild)     HENT:   Head: Normocephalic and atraumatic  Mouth/Throat: Oropharynx is clear and moist    Eyes: No scleral icterus  Neck: No JVD present  No tracheal deviation present  Cardiovascular: Normal rate, regular rhythm and normal heart sounds  No murmur heard  Pulmonary/Chest: Effort normal and breath sounds normal  No respiratory distress  Abdominal: Soft  Bowel sounds are normal  She exhibits no distension  There is tenderness (LUQ)  There is no rebound and no guarding  Musculoskeletal: She exhibits edema (bilateral LE edema)  She exhibits no deformity  Neurological: She is alert and oriented to person, place, and time  Skin: Skin is warm and dry  No rash noted  Psychiatric: She has a normal mood and affect  Nursing note and vitals reviewed        Vital Signs  ED Triage Vitals [02/13/19 0149]   Temperature Pulse Respirations Blood Pressure SpO2   98 1 °F (36 7 °C) 78 20 148/73 97 %      Temp Source Heart Rate Source Patient Position - Orthostatic VS BP Location FiO2 (%)   Oral Monitor Lying Right arm --      Pain Score       Worst Possible Pain           Vitals:    02/13/19 0149 02/13/19 0417   BP: 148/73 125/60   Pulse: 78 63   Patient Position - Orthostatic VS: Lying Lying       Visual Acuity      ED Medications  Medications   sodium chloride 0 9 % infusion (has no administration in time range)   cephalexin (KEFLEX) capsule 500 mg (has no administration in time range)   sodium chloride 0 9 % bolus 1,000 mL (1,000 mL Intravenous New Bag 2/13/19 0313)   famotidine (PEPCID) injection 20 mg (20 mg Intravenous Given 2/13/19 0315)   iohexol (OMNIPAQUE) 350 MG/ML injection (SINGLE-DOSE) 100 mL (100 mL Intravenous Given 2/13/19 0355)       Diagnostic Studies  Results Reviewed     Procedure Component Value Units Date/Time    Quantitative hCG [[id number]]  (Normal) Collected:  02/13/19 0312    Lab Status:  Final result Specimen:  Blood from Arm, Right Updated:  02/13/19 0359     HCG, Quant <2 mIU/mL     Narrative:        Expected Ranges: Approximate               Approximate HCG  Gestation age          Concentration ( mIU/mL)  _____________          ______________________   Todd Dougherty                      HCG values  0 2-1                       5-50  1-2                           2-3                         100-5000  3-4                         500-54670  4-5                         1000-80447  5-6                         23274-304166  6-8                         49993-362197  8-12                        14023-545693    Lipase [[id number]]  (Normal) Collected:  02/13/19 0312    Lab Status:  Final result Specimen:  Blood from Arm, Right Updated:  02/13/19 0350     Lipase 124 u/L     TSH [[id number]]  (Normal) Collected:  02/13/19 1514    Lab Status:  Final result Specimen:  Blood from Arm, Right Updated:  02/13/19 0350     TSH 3RD GENERATON 1 882 uIU/mL     Narrative:       Patients undergoing fluorescein dye angiography may retain small amounts of fluorescein in the body for 48-72 hours post procedure  Samples containing fluorescein can produce falsely depressed TSH values  If the patient had this procedure,a specimen should be resubmitted post fluorescein clearance  Urine culture [[id number]] Collected:  02/13/19 0323    Lab Status: In process Specimen:  Urine, Clean Catch Updated:  02/13/19 0346    Lactic acid, plasma [[id number]]  (Normal) Collected:  02/13/19 0312    Lab Status:  Final result Specimen:  Blood from Arm, Right Updated:  02/13/19 0344     LACTIC ACID 0 8 mmol/L     Narrative:       Result may be elevated if tourniquet was used during collection      Comprehensive metabolic panel [[id number]]  (Abnormal) Collected:  02/13/19 0312    Lab Status:  Final result Specimen:  Blood from Arm, Right Updated:  02/13/19 0340     Sodium 141 mmol/L      Potassium 4 0 mmol/L      Chloride 103 mmol/L      CO2 28 mmol/L      ANION GAP 10 mmol/L      BUN 8 mg/dL      Creatinine 0 67 mg/dL      Glucose 91 mg/dL      Calcium 9 3 mg/dL      AST 25 U/L ALT 47 U/L      Alkaline Phosphatase 208 U/L      Total Protein 7 6 g/dL      Albumin 3 7 g/dL      Total Bilirubin 0 20 mg/dL      eGFR 120 ml/min/1 73sq m     Narrative:       National Kidney Disease Education Program recommendations are as follows:  GFR calculation is accurate only with a steady state creatinine  Chronic Kidney disease less than 60 ml/min/1 73 sq  meters  Kidney failure less than 15 ml/min/1 73 sq  meters      Urine Microscopic [[id number]]  (Abnormal) Collected:  02/13/19 0323    Lab Status:  Final result Specimen:  Urine, Clean Catch Updated:  02/13/19 0339     RBC, UA None Seen /hpf      WBC, UA 4-10 /hpf      Epithelial Cells Occasional /hpf      Bacteria, UA Occasional /hpf      MUCUS THREADS Occasional    POCT pregnancy, urine [[id number]]  (Normal) Resulted:  02/13/19 0323    Lab Status:  Final result Updated:  02/13/19 0323     EXT PREG TEST UR (Ref: Negative) Negative    CBC and differential [[id number]]  (Abnormal) Collected:  02/13/19 0312    Lab Status:  Final result Specimen:  Blood from Arm, Right Updated:  02/13/19 0321     WBC 10 29 Thousand/uL      RBC 5 10 Million/uL      Hemoglobin 12 0 g/dL      Hematocrit 39 1 %      MCV 77 fL      MCH 23 5 pg      MCHC 30 7 g/dL      RDW 15 9 %      MPV 10 0 fL      Platelets 669 Thousands/uL      nRBC 0 /100 WBCs      Neutrophils Relative 56 %      Immat GRANS % 0 %      Lymphocytes Relative 24 %      Monocytes Relative 6 %      Eosinophils Relative 13 %      Basophils Relative 1 %      Neutrophils Absolute 5 67 Thousands/µL      Immature Grans Absolute 0 04 Thousand/uL      Lymphocytes Absolute 2 45 Thousands/µL      Monocytes Absolute 0 65 Thousand/µL      Eosinophils Absolute 1 37 Thousand/µL      Basophils Absolute 0 11 Thousands/µL     ED Urine Macroscopic [[id number]]  (Abnormal) Collected:  02/13/19 0323    Lab Status:  Final result Specimen:  Urine Updated:  02/13/19 0321     Color, UA Yellow     Clarity, UA Clear     pH, UA 6 0 Leukocytes, UA Trace     Nitrite, UA Negative     Protein, UA Negative mg/dl      Glucose, UA Negative mg/dl      Ketones, UA Negative mg/dl      Urobilinogen, UA 0 2 E U /dl      Bilirubin, UA Negative     Blood, UA Negative     Specific Gravity, UA >=1 030    Narrative:       CLINITEK RESULT                 CT abdomen pelvis with contrast   ED Interpretation by Samir Ferreira MD (02/13 3600)   FINDINGS:      ABDOMEN      LOWER CHEST:  No clinically significant abnormality identified in the visualized lower chest       LIVER/BILIARY TREE:  The liver is enlarged  GALLBLADDER:  No calcified gallstones  No pericholecystic inflammatory change  SPLEEN:  Unremarkable  PANCREAS:  Mild fullness versus volume averaging within the pancreatic head is visualized  ADRENAL GLANDS:  Unremarkable  KIDNEYS/URETERS:  Unremarkable  No hydronephrosis  STOMACH AND BOWEL:  Unremarkable  APPENDIX:  No findings to suggest appendicitis  ABDOMINOPELVIC CAVITY:  No ascites or free intraperitoneal air  No lymphadenopathy  VESSELS:  Unremarkable for patient's age  PELVIS      REPRODUCTIVE ORGANS: Sai Hammed is a 1 3 cm fat-containing lesion in the left adnexa likely representing a dermoid      URINARY BLADDER:  Mild inflammatory changes around the urinary bladder is visualized  ABDOMINAL WALL/INGUINAL REGIONS:  Unremarkable  OSSEOUS STRUCTURES:  No acute fracture or destructive osseous lesion  Impression:        Mild inflammatory changes around the urinary bladder which may represent cystitis  Questionable fullness within the pancreatic head versus volume averaging   Clinical correlation with laboratory values is recommended to rule out acute pancreatitis        Left adnexal fat-containing lesion likely representing a dermoid      Workstation performed: [id number]         Final Result by Tree Flores DO (02/13 0386)      Mild inflammatory changes around the urinary bladder which may represent cystitis  Questionable fullness within the pancreatic head versus volume averaging  Clinical correlation with laboratory values is recommended to rule out acute pancreatitis  Left adnexal fat-containing lesion likely representing a dermoid      Workstation performed: [id number]                    Procedures  ECG 12 Lead Documentation  Date/Time: 2/13/2019 2:18 AM  Performed by: Abhay Patterson MD  Authorized by: Abhay Patterson MD     Indications / Diagnosis:  Palpitations  ECG reviewed by me, the ED Provider: yes    Patient location:  ED  Previous ECG:     Previous ECG:  Compared to current    Comparison ECG info:  1/23/19    Similarity:  Changes noted (not s  ajith now)  Interpretation:     Interpretation: normal    Rate:     ECG rate:  65    ECG rate assessment: normal    Rhythm:     Rhythm: sinus rhythm    Ectopy:     Ectopy: none    QRS:     QRS axis:  Normal    QRS intervals:  Normal  Conduction:     Conduction: normal    ST segments:     ST segments:  Normal  T waves:     T waves: normal             Phone Contacts  ED Phone Contact    ED Course  ED Course as of Feb 13 0436   Wed Feb 13, 2019   0234 EKG d/w patient        65 Labs d/w patient  Patient feeling better  0430 CT d/w patient  Patient knows about dermoid cyst  No acute abdomen prior to discharge  MDM  Number of Diagnoses or Management Options  Diagnosis management comments: DDx including but not limited to: appendicitis, gastroenteritis, gastritis, PUD, GERD, gastroparesis, hepatitis, pancreatitis, colitis, enteritis, food poisoning, mesenteric adenitis, IBD, IBS, ileus, bowel obstruction, volvulus, cholecystitis, biliary colic, choledocholithiasis, perforated viscus, splenic etiology, diverticulitis, internal hernia, constipation, pelvic pathology, renal colic, pyelonephritis, UTI    DDx including but not limited to: metabolic abnormality, cardiac arrhythmia; doubt ACS, MI; thyroid disease; doubt PE; anxiety, adverse reaction  Amount and/or Complexity of Data Reviewed  Clinical lab tests: ordered and reviewed  Tests in the radiology section of CPT®: ordered and reviewed  Decide to obtain previous medical records or to obtain history from someone other than the patient: yes  Obtain history from someone other than the patient: yes  Review and summarize past medical records: yes  Independent visualization of images, tracings, or specimens: yes        Disposition  Final diagnoses:   Left upper quadrant pain   Palpitations   UTI (urinary tract infection)   Elevated serum alkaline phosphatase level   Eosinophilia     Time reflects when diagnosis was documented in both MDM as applicable and the Disposition within this note     Time User Action Codes Description Comment    2/13/2019  4:33 AM Rae Harness Add [R10 12] Left upper quadrant pain     2/13/2019  4:33 AM Rae Harness Add [R00 2] Palpitations     2/13/2019  4:33 AM Rae Harness Add [N39 0] UTI (urinary tract infection)     2/13/2019  4:34 AM Rae Harness Add [R74 8] Elevated serum alkaline phosphatase level     2/13/2019  4:34 AM Rae Harness Add [D72 1] Eosinophilia       ED Disposition     ED Disposition Condition Date/Time Comment    Discharge Stable Wed Feb 13, 2019  4:33 AM Cecil Enciso discharge to home/self care  Follow-up Information     Follow up With Specialties Details Why Contact Jae Ortiz DO Family Medicine Call in 1 day Clear fluids for 1-2 days and then advance diet as tolerated  No greasy or spicy or fatty foods  Return sooner if increased pain, fever, vomiting, diarrhea, rash, difficulty breathing or urinating   [address]            Patient's Medications   Discharge Prescriptions    CEPHALEXIN (KEFLEX) 500 MG CAPSULE    Take 1 capsule (500 mg total) by mouth 4 (four) times a day for 7 days       Start Date: 2/13/2019 End Date: 2/20/2019       Order Dose: 500 mg       Quantity: 28 capsule    Refills: 0    OMEPRAZOLE (PRILOSEC) 20 MG DELAYED RELEASE CAPSULE    Take 1 capsule (20 mg total) by mouth daily for 20 days       Start Date: 2/13/2019 End Date: 3/5/2019       Order Dose: 20 mg       Quantity: 20 capsule    Refills: 0     No discharge procedures on file      ED Provider  Electronically Signed by           Jeff Flanagan MD  02/13/19 0197

## 2019-02-14 LAB — BACTERIA UR CULT: NORMAL

## 2025-06-25 ENCOUNTER — HOSPITAL ENCOUNTER (EMERGENCY)
Facility: HOSPITAL | Age: 35
Discharge: HOME | End: 2025-06-25
Attending: EMERGENCY MEDICINE
Payer: MEDICARE

## 2025-06-25 ENCOUNTER — APPOINTMENT (EMERGENCY)
Dept: RADIOLOGY | Facility: HOSPITAL | Age: 35
End: 2025-06-25
Payer: MEDICARE

## 2025-06-25 VITALS
DIASTOLIC BLOOD PRESSURE: 53 MMHG | BODY MASS INDEX: 25.76 KG/M2 | HEART RATE: 84 BPM | SYSTOLIC BLOOD PRESSURE: 94 MMHG | OXYGEN SATURATION: 99 % | HEIGHT: 68 IN | TEMPERATURE: 97.3 F | RESPIRATION RATE: 20 BRPM | WEIGHT: 170 LBS

## 2025-06-25 DIAGNOSIS — R07.9 CHEST PAIN: Primary | ICD-10-CM

## 2025-06-25 LAB
2HR DELTA HS TROPONIN: <-1 NG/L
ALBUMIN SERPL BCG-MCNC: 4.3 G/DL (ref 3.5–5)
ALP SERPL-CCNC: 154 U/L (ref 34–104)
ALT SERPL W P-5'-P-CCNC: 30 U/L (ref 7–52)
ANION GAP SERPL CALCULATED.3IONS-SCNC: 7 MMOL/L (ref 4–13)
AST SERPL W P-5'-P-CCNC: 27 U/L (ref 13–39)
ATRIAL RATE: 166 BPM
BASOPHILS # BLD AUTO: 0.06 THOUSANDS/ÂΜL (ref 0–0.1)
BASOPHILS NFR BLD AUTO: 1 % (ref 0–1)
BILIRUB SERPL-MCNC: 0.27 MG/DL (ref 0.2–1)
BUN SERPL-MCNC: 11 MG/DL (ref 5–25)
CALCIUM SERPL-MCNC: 9 MG/DL (ref 8.4–10.2)
CARDIAC TROPONIN I PNL SERPL HS: 3 NG/L (ref ?–50)
CARDIAC TROPONIN I PNL SERPL HS: <2 NG/L (ref ?–50)
CHLORIDE SERPL-SCNC: 106 MMOL/L (ref 96–108)
CO2 SERPL-SCNC: 25 MMOL/L (ref 21–32)
CREAT SERPL-MCNC: 0.6 MG/DL (ref 0.6–1.3)
EOSINOPHIL # BLD AUTO: 0.66 THOUSAND/ÂΜL (ref 0–0.61)
EOSINOPHIL NFR BLD AUTO: 12 % (ref 0–6)
ERYTHROCYTE [DISTWIDTH] IN BLOOD BY AUTOMATED COUNT: 13.8 % (ref 11.6–15.1)
GFR SERPL CREATININE-BSD FRML MDRD: 118 ML/MIN/1.73SQ M
GLUCOSE SERPL-MCNC: 89 MG/DL (ref 65–140)
HCT VFR BLD AUTO: 36.6 % (ref 34.8–46.1)
HGB BLD-MCNC: 11.4 G/DL (ref 11.5–15.4)
IMM GRANULOCYTES # BLD AUTO: 0.01 THOUSAND/UL (ref 0–0.2)
IMM GRANULOCYTES NFR BLD AUTO: 0 % (ref 0–2)
LYMPHOCYTES # BLD AUTO: 1.5 THOUSANDS/ÂΜL (ref 0.6–4.47)
LYMPHOCYTES NFR BLD AUTO: 27 % (ref 14–44)
MCH RBC QN AUTO: 25.2 PG (ref 26.8–34.3)
MCHC RBC AUTO-ENTMCNC: 31.1 G/DL (ref 31.4–37.4)
MCV RBC AUTO: 81 FL (ref 82–98)
MONOCYTES # BLD AUTO: 0.35 THOUSAND/ÂΜL (ref 0.17–1.22)
MONOCYTES NFR BLD AUTO: 6 % (ref 4–12)
NEUTROPHILS # BLD AUTO: 2.99 THOUSANDS/ÂΜL (ref 1.85–7.62)
NEUTS SEG NFR BLD AUTO: 54 % (ref 43–75)
NRBC BLD AUTO-RTO: 0 /100 WBCS
PLATELET # BLD AUTO: 257 THOUSANDS/UL (ref 149–390)
PMV BLD AUTO: 10.6 FL (ref 8.9–12.7)
POTASSIUM SERPL-SCNC: 3.9 MMOL/L (ref 3.5–5.3)
PROT SERPL-MCNC: 7.3 G/DL (ref 6.4–8.4)
QRS AXIS: 14 DEGREES
QRSD INTERVAL: 96 MS
QT INTERVAL: 348 MS
QTC INTERVAL: 432 MS
RBC # BLD AUTO: 4.52 MILLION/UL (ref 3.81–5.12)
SODIUM SERPL-SCNC: 138 MMOL/L (ref 135–147)
T WAVE AXIS: 58 DEGREES
VENTRICULAR RATE: 93 BPM
WBC # BLD AUTO: 5.57 THOUSAND/UL (ref 4.31–10.16)

## 2025-06-25 PROCEDURE — 85025 COMPLETE CBC W/AUTO DIFF WBC: CPT

## 2025-06-25 PROCEDURE — 93010 ELECTROCARDIOGRAM REPORT: CPT | Performed by: INTERNAL MEDICINE

## 2025-06-25 PROCEDURE — 84484 ASSAY OF TROPONIN QUANT: CPT

## 2025-06-25 PROCEDURE — 99285 EMERGENCY DEPT VISIT HI MDM: CPT | Performed by: EMERGENCY MEDICINE

## 2025-06-25 PROCEDURE — 36415 COLL VENOUS BLD VENIPUNCTURE: CPT

## 2025-06-25 PROCEDURE — 71046 X-RAY EXAM CHEST 2 VIEWS: CPT

## 2025-06-25 PROCEDURE — 93005 ELECTROCARDIOGRAM TRACING: CPT

## 2025-06-25 PROCEDURE — 99285 EMERGENCY DEPT VISIT HI MDM: CPT

## 2025-06-25 PROCEDURE — 80053 COMPREHEN METABOLIC PANEL: CPT

## 2025-06-25 RX ORDER — KETOROLAC TROMETHAMINE 30 MG/ML
15 INJECTION, SOLUTION INTRAMUSCULAR; INTRAVENOUS ONCE
Status: DISCONTINUED | OUTPATIENT
Start: 2025-06-25 | End: 2025-06-25

## 2025-06-25 RX ORDER — TIRZEPATIDE 7.5 MG/.5ML
7.5 INJECTION, SOLUTION SUBCUTANEOUS WEEKLY
COMMUNITY
Start: 2025-06-17

## 2025-06-25 RX ORDER — ACETAMINOPHEN 325 MG/1
650 TABLET ORAL ONCE
Status: DISCONTINUED | OUTPATIENT
Start: 2025-06-25 | End: 2025-06-25 | Stop reason: HOSPADM

## 2025-06-25 NOTE — ED ATTENDING ATTESTATION
6/25/2025  IJett DO, saw and evaluated the patient. I have discussed the patient with the resident/non-physician practitioner and agree with the resident's/non-physician practitioner's findings, Plan of Care, and MDM as documented in the resident's/non-physician practitioner's note, except where noted. All available labs and Radiology studies were reviewed.  I was present for key portions of any procedure(s) performed by the resident/non-physician practitioner and I was immediately available to provide assistance.       At this point I agree with the current assessment done in the Emergency Department.  I have conducted an independent evaluation of this patient a history and physical is as follows:    35-year-old female presents for evaluation of sharp left-sided and substernal chest pain associated with paresthesias in her left upper extremity, lightheadedness and nausea.  She was in the hospital visiting her child who was admitted.  The chest pain remains but the other symptoms resolved spontaneously.  She does have a history of anxiety but states this feels different.  She has no known history of MI but has never had a cardiac stress test or catheterization.  She did have a history of atrial fibrillation for which she was cardioverted.  She has also had gastric bypass.    No recent travel or sick contacts.    ROS: Denies f/c, HA, dizziness, diaphoresis, SOB, abdominal pain, v/d. 12 system ROS o/w negative.     PE: NAD, appears comfortable, alert; PERRL, EOMI; MMM, no posterior oropharyngeal exudate, edema or erythema; HRR, no murmur, monitor shows sinus rhythm at 88 bpm; lungs CTA w/o w/r/r, POx 100% on RA (nl); abdomen s/nt/nd, nl BS in all 4 quadrants; (-) LE edema or calf TTP, FROM extremities x4; skin p/w/d; CNs GI/NF, oriented.    MDM/DDx: Chest pain - ACS/MI including Takotsubo's cardiomyopathy, GERD, costochondritis, anxiety, less likely but at risk for pneumonia, pneumothorax or PE.     I  independently reviewed and interpreted ordered labs, CXR and EKG from this encounter.    A/P: Will check cardiac w/u, await delta troponin and EKG, treat symptoms, reevaluate for disposition.    ED Course         Critical Care Time  ECG 12 Lead Documentation Only    Date/Time: 6/25/2025 11:25 AM    Performed by: Jett Barr DO  Authorized by: Jett Barr DO    Indications / Diagnosis:  Chest pain  ECG reviewed by me, the ED Provider: yes    Patient location:  ED  Interpretation:     Interpretation: normal    Rate:     ECG rate:  88    ECG rate assessment: normal    Rhythm:     Rhythm: sinus rhythm    Ectopy:     Ectopy: none    Conduction:     Conduction: normal    ST segments:     ST segments:  Normal  T waves:     T waves: normal

## 2025-06-25 NOTE — DISCHARGE INSTRUCTIONS
You were seen in the emergency department for chest pain. Your lab work, chest xray, and EKG were reassuring. You can take tylenol 650mg or motrin 600mg every 6 hours as needed for pain.  Please follow-up with your primary care doctor.  Return to the emergency department if you have worsening chest pain, shortness of breath, you pass out, or any new or concerning symptoms.

## 2025-06-25 NOTE — ED PROVIDER NOTES
ED Disposition       None          Assessment & Plan   {Hyperlinks  Risk Stratification - NIHSS - HEART SCORE - Fill out sepsis note and make sure you call [id number] if severe or septic shock:[id number]}    Medical Decision Making  Risk  Prescription drug management.        ED Course as of 06/25/25 1122   Wed Jun 25, 2025   1108 EKG interpreted by me, normal sinus rhythm at a rate of 93, normal axis, normal intervals, no ST elevations or depressions, no significant change to prior EKG       Medications   ketorolac (TORADOL) injection 15 mg (has no administration in time range)       ED Risk Strat Scores                    No data recorded        SBIRT 22yo+      Flowsheet Row Most Recent Value   Initial Alcohol Screen: US AUDIT-C     1. How often do you have a drink containing alcohol? 0 Filed at: 06/25/2025 1106   2. How many drinks containing alcohol do you have on a typical day you are drinking?  0 Filed at: 06/25/2025 1106   3b. FEMALE Any Age, or MALE 65+: How often do you have 4 or more drinks on one occassion? 0 Filed at: 06/25/2025 1106   Audit-C Score 0 Filed at: 06/25/2025 1106   DEISY: How many times in the past year have you...    Used an illegal drug or used a prescription medication for non-medical reasons? Never Filed at: 06/25/2025 1106                            History of Present Illness   {Hyperlinks  History (Med, Surg, Fam, Social) - Current Medications - Allergies  :[id number]}    Chief Complaint   Patient presents with    Chest Pain     Patient presented to the emergency department after being with her son in [facility]. While visiting, she started approx 10 minutes ago with substernal continuous chest pain that she describes as sharp with pain/tingling down left arm.        Past Medical History[1]   Past Surgical History[2]   Family History[3]   Social History[4]   E-Cigarette/Vaping    E-Cigarette Use Current Every Day User       E-Cigarette/Vaping Substances      I have reviewed and agree with the  history as documented.     35-year-old female past medical history of A-fib cardioversion back to sinus rhythm, anxiety, asthma presents for evaluation of chest pain.  This started approximately 15 minutes ago while she was in the pharmacy she describes it as left-sided radiating to left arm with tingling.  She is experiencing some nausea, feeling of needing to have a bowel movement, lightheadedness at this time which has resolved.  She is currently experiencing a lot of stress as her child is admitted to the pediatric floor.  She has never had chest pain like this before.  She does not feel like this is her anxiety.  Denies fever, chills, cough, shortness of breath, abdominal pain, diarrhea, vomiting.      Chest Pain  Associated symptoms: nausea    Associated symptoms: no abdominal pain, no back pain, no cough, no fever, no palpitations, no shortness of breath and not vomiting        Review of Systems   Constitutional:  Negative for chills and fever.   HENT:  Negative for ear pain and sore throat.    Eyes:  Negative for pain and visual disturbance.   Respiratory:  Negative for cough and shortness of breath.    Cardiovascular:  Positive for chest pain. Negative for palpitations.   Gastrointestinal:  Positive for nausea. Negative for abdominal pain and vomiting.   Genitourinary:  Negative for dysuria and hematuria.   Musculoskeletal:  Negative for arthralgias and back pain.   Skin:  Negative for color change and rash.   Neurological:  Positive for light-headedness. Negative for seizures and syncope.   All other systems reviewed and are negative.          Objective   {Hyperlinks  Historical Vitals - Historical Labs - Chart Review/Microbiology - Last Echo - Code Status  :[id number]}    ED Triage Vitals   Temperature Pulse Blood Pressure Respirations SpO2 Patient Position - Orthostatic VS   06/25/25 1106 06/25/25 1104 06/25/25 1104 06/25/25 1104 06/25/25 1104 06/25/25 1104   (!) 97.3 °F (36.3 °C) 87 110/55 18 100 %  Lying      Temp Source Heart Rate Source BP Location FiO2 (%) Pain Score    06/25/25 1106 06/25/25 1104 06/25/25 1104 -- 06/25/25 1104    Oral Monitor Left arm  9      Vitals      Date and Time Temp Pulse SpO2 Resp BP Pain Score FACES Pain Rating User   06/25/25 1106 97.3 °F (36.3 °C) -- -- -- -- -- -- AMS   06/25/25 1104 -- 87 100 % 18 110/55 9 -- AMS            Physical Exam  Vitals and nursing note reviewed.   Constitutional:       General: She is not in acute distress.     Appearance: She is well-developed. She is not ill-appearing.   HENT:      Head: Normocephalic and atraumatic.     Eyes:      Conjunctiva/sclera: Conjunctivae normal.       Cardiovascular:      Rate and Rhythm: Normal rate and regular rhythm.      Heart sounds: Normal heart sounds. No murmur heard.  Pulmonary:      Effort: Pulmonary effort is normal. No respiratory distress.      Breath sounds: Normal breath sounds. No stridor, decreased air movement or transmitted upper airway sounds.   Abdominal:      Palpations: Abdomen is soft.      Tenderness: There is no abdominal tenderness. There is no guarding or rebound.     Musculoskeletal:         General: No swelling.      Cervical back: Neck supple.     Skin:     General: Skin is warm and dry.      Capillary Refill: Capillary refill takes less than 2 seconds.     Neurological:      Mental Status: She is alert and oriented to person, place, and time.     Psychiatric:         Mood and Affect: Mood normal.      Comments: Anxious appearing, shaking legs         Results Reviewed       Procedure Component Value Units Date/Time    CBC and differential [[id number]]  (Abnormal) Collected: 06/25/25 1103    Lab Status: Final result Specimen: Blood from Arm, Right Updated: 06/25/25 1115     WBC 5.57 Thousand/uL      RBC 4.52 Million/uL      Hemoglobin 11.4 g/dL      Hematocrit 36.6 %      MCV 81 fL      MCH 25.2 pg      MCHC 31.1 g/dL      RDW 13.8 %      MPV 10.6 fL      Platelets 257 Thousands/uL      nRBC 0  /100 WBCs      Segmented % 54 %      Immature Grans % 0 %      Lymphocytes % 27 %      Monocytes % 6 %      Eosinophils Relative 12 %      Basophils Relative 1 %      Absolute Neutrophils 2.99 Thousands/µL      Absolute Immature Grans 0.01 Thousand/uL      Absolute Lymphocytes 1.50 Thousands/µL      Absolute Monocytes 0.35 Thousand/µL      Eosinophils Absolute 0.66 Thousand/µL      Basophils Absolute 0.06 Thousands/µL     HS Troponin 0hr (reflex protocol) [[id number]] Collected: 25    Lab Status: In process Specimen: Blood from Arm, Right Updated: 25    Comprehensive metabolic panel [[id number]] Collected: 25    Lab Status: In process Specimen: Blood from Arm, Right Updated: 25            XR chest 2 views    (Results Pending)       Procedures    ED Medication and Procedure Management   Prior to Admission Medications   Prescriptions Last Dose Informant Patient Reported? Taking?   IRON, FERROUS GLUCONATE, PO Past Week  Yes Yes   Sig: Take 1 tablet by mouth Daily at 2am   Zepbound 7.5 MG/0.5ML auto-injector 2025  Yes Yes   Si.5 mg once a week   omeprazole (PriLOSEC) 20 mg delayed release capsule   No Yes   Sig: Take 1 capsule (20 mg total) by mouth daily for 20 days      Facility-Administered Medications: None     Patient's Medications   Discharge Prescriptions    No medications on file     No discharge procedures on file.  ED SEPSIS DOCUMENTATION              [1]   Past Medical History:  Diagnosis Date    A-fib (HCC)     PCOS (polycystic ovarian syndrome)    [2]   Past Surgical History:  Procedure Laterality Date    GASTRIC BYPASS      OOPHORECTOMY      OOPHORECTOMY Right    [3] No family history on file.  [4]   Social History  Tobacco Use    Smoking status: Former     Current packs/day: 0.00     Types: Cigarettes     Quit date: 2018     Years since quittin.3    Smokeless tobacco: Never   Vaping Use    Vaping status: Every Day   Substance Use Topics     Alcohol use: No    Drug use: No          Absolute Monocytes 0.35 Thousand/µL      Eosinophils Absolute 0.66 Thousand/µL      Basophils Absolute 0.06 Thousands/µL             XR chest 2 views   ED Interpretation by Leah Carvajal DO ( [id number])   No acute cardiopulmonary disease on my interpretation       Final Interpretation by Donta Muñiz MD ( [id number])      No acute cardiopulmonary disease.            Workstation performed: [id number]             Procedures    ED Medication and Procedure Management   Prior to Admission Medications   Prescriptions Last Dose Informant Patient Reported? Taking?   IRON, FERROUS GLUCONATE, PO Past Week  Yes Yes   Sig: Take 1 tablet by mouth Daily at 2am   Zepbound 7.5 MG/0.5ML auto-injector 2025  Yes Yes   Si.5 mg once a week   omeprazole (PriLOSEC) 20 mg delayed release capsule   No Yes   Sig: Take 1 capsule (20 mg total) by mouth daily for 20 days      Facility-Administered Medications: None     Discharge Medication List as of 2025  1:52 PM        CONTINUE these medications which have NOT CHANGED    Details   IRON, FERROUS GLUCONATE, PO Take 1 tablet by mouth Daily at 2am, Historical Med      omeprazole (PriLOSEC) 20 mg delayed release capsule Take 1 capsule (20 mg total) by mouth daily for 20 days, Starting 2019, Until Tue 3/5/2019, Print      Zepbound 7.5 MG/0.5ML auto-injector 7.5 mg once a week, Starting 2025, Historical Med           No discharge procedures on file.  ED SEPSIS DOCUMENTATION   Time reflects when diagnosis was documented in both MDM as applicable and the Disposition within this note       Time User Action Codes Description Comment    2025 11:48 AM Leah Carvajal Add [R07.9] Chest pain                    [1]   Past Medical History:  Diagnosis Date    A-fib (HCC)     PCOS (polycystic ovarian syndrome)    [2]   Past Surgical History:  Procedure Laterality Date    GASTRIC BYPASS      OOPHORECTOMY      OOPHORECTOMY Right    [3] No family history on file.  [4]    Social History  Tobacco Use    Smoking status: Former     Current packs/day: 0.00     Types: Cigarettes     Quit date: 2018     Years since quittin.3    Smokeless tobacco: Never   Vaping Use    Vaping status: Every Day   Substance Use Topics    Alcohol use: No    Drug use: Sandra Carvajal DO  25 1839

## 2025-06-25 NOTE — PROGRESS NOTES
met with the patient in her room where she was teary.  Patient is worried about her son in the pediatric unit.  assured her that her son was fine and playing happily in his bed under supervision.   encouraged patient to take care of herself and offered to call someone for her.  Patient stated she had called her  and he ws on his way.  Patient just asked that we take care of her son which  said not to worry that staff was taking good care of him.  remains available.      06/25/25 1200   Clinical Encounter Type   Visited With Patient   Crisis Visit Code